# Patient Record
Sex: FEMALE | Race: WHITE | NOT HISPANIC OR LATINO | URBAN - METROPOLITAN AREA
[De-identification: names, ages, dates, MRNs, and addresses within clinical notes are randomized per-mention and may not be internally consistent; named-entity substitution may affect disease eponyms.]

---

## 2017-02-08 ENCOUNTER — TRANSFERRED RECORDS (OUTPATIENT)
Dept: HEALTH INFORMATION MANAGEMENT | Facility: CLINIC | Age: 52
End: 2017-02-08

## 2017-02-21 ENCOUNTER — TRANSFERRED RECORDS (OUTPATIENT)
Dept: HEALTH INFORMATION MANAGEMENT | Facility: CLINIC | Age: 52
End: 2017-02-21

## 2017-02-22 ENCOUNTER — TRANSFERRED RECORDS (OUTPATIENT)
Dept: HEALTH INFORMATION MANAGEMENT | Facility: CLINIC | Age: 52
End: 2017-02-22

## 2017-02-24 ENCOUNTER — TRANSFERRED RECORDS (OUTPATIENT)
Dept: HEALTH INFORMATION MANAGEMENT | Facility: CLINIC | Age: 52
End: 2017-02-24

## 2017-02-27 ENCOUNTER — TRANSFERRED RECORDS (OUTPATIENT)
Dept: HEALTH INFORMATION MANAGEMENT | Facility: CLINIC | Age: 52
End: 2017-02-27

## 2017-02-28 ENCOUNTER — TRANSFERRED RECORDS (OUTPATIENT)
Dept: HEALTH INFORMATION MANAGEMENT | Facility: CLINIC | Age: 52
End: 2017-02-28

## 2017-03-02 ENCOUNTER — TRANSFERRED RECORDS (OUTPATIENT)
Dept: HEALTH INFORMATION MANAGEMENT | Facility: CLINIC | Age: 52
End: 2017-03-02

## 2017-03-08 ENCOUNTER — ONCOLOGY VISIT (OUTPATIENT)
Dept: ONCOLOGY | Facility: CLINIC | Age: 52
End: 2017-03-08
Payer: COMMERCIAL

## 2017-03-08 ENCOUNTER — OFFICE VISIT (OUTPATIENT)
Dept: RADIATION ONCOLOGY | Facility: CLINIC | Age: 52
End: 2017-03-08
Payer: COMMERCIAL

## 2017-03-08 VITALS
HEART RATE: 86 BPM | WEIGHT: 226 LBS | TEMPERATURE: 98.2 F | SYSTOLIC BLOOD PRESSURE: 127 MMHG | OXYGEN SATURATION: 96 % | DIASTOLIC BLOOD PRESSURE: 86 MMHG | RESPIRATION RATE: 18 BRPM | HEIGHT: 65 IN | BODY MASS INDEX: 37.65 KG/M2

## 2017-03-08 VITALS
HEART RATE: 86 BPM | BODY MASS INDEX: 37.75 KG/M2 | OXYGEN SATURATION: 96 % | RESPIRATION RATE: 18 BRPM | TEMPERATURE: 98.2 F | HEIGHT: 65 IN | WEIGHT: 226.6 LBS | DIASTOLIC BLOOD PRESSURE: 86 MMHG | SYSTOLIC BLOOD PRESSURE: 127 MMHG

## 2017-03-08 DIAGNOSIS — C20 MALIGNANT NEOPLASM OF RECTUM (H): Primary | ICD-10-CM

## 2017-03-08 PROCEDURE — 99205 OFFICE O/P NEW HI 60 MIN: CPT | Performed by: INTERNAL MEDICINE

## 2017-03-08 PROCEDURE — 77263 THER RADIOLOGY TX PLNG CPLX: CPT | Performed by: RADIOLOGY

## 2017-03-08 PROCEDURE — 99205 OFFICE O/P NEW HI 60 MIN: CPT | Mod: 25 | Performed by: RADIOLOGY

## 2017-03-08 PROCEDURE — 77290 THER RAD SIMULAJ FIELD CPLX: CPT | Performed by: RADIOLOGY

## 2017-03-08 PROCEDURE — 77334 RADIATION TREATMENT AID(S): CPT | Performed by: RADIOLOGY

## 2017-03-08 RX ORDER — PROCHLORPERAZINE MALEATE 10 MG
10 TABLET ORAL EVERY 6 HOURS PRN
Qty: 30 TABLET | Refills: 2 | Status: SHIPPED | OUTPATIENT
Start: 2017-03-08

## 2017-03-08 RX ORDER — LORAZEPAM 0.5 MG/1
0.5 TABLET ORAL EVERY 4 HOURS PRN
Qty: 30 TABLET | Refills: 2 | Status: SHIPPED | OUTPATIENT
Start: 2017-03-08

## 2017-03-08 RX ORDER — CAPECITABINE 500 MG/1
TABLET, FILM COATED ORAL
Qty: 392 TABLET | Refills: 0 | Status: CANCELLED | OUTPATIENT
Start: 2017-03-20

## 2017-03-08 RX ORDER — CAPECITABINE 500 MG/1
TABLET, FILM COATED ORAL
Qty: 196 TABLET | Refills: 0 | Status: SHIPPED
Start: 2017-03-08 | End: 2017-04-13

## 2017-03-08 ASSESSMENT — ENCOUNTER SYMPTOMS
DIZZINESS: 0
SPEECH CHANGE: 0
NERVOUS/ANXIOUS: 1
NECK PAIN: 0
FOCAL WEAKNESS: 0
NAUSEA: 1
CONSTIPATION: 0
TINGLING: 0
WEAKNESS: 0
FEVER: 0
HALLUCINATIONS: 0
INSOMNIA: 0
MEMORY LOSS: 0
DIAPHORESIS: 0
LOSS OF CONSCIOUSNESS: 0
MYALGIAS: 0
VOMITING: 1
SORE THROAT: 0
SENSORY CHANGE: 0
EYES NEGATIVE: 1
BLOOD IN STOOL: 1
STRIDOR: 0
FALLS: 0
CARDIOVASCULAR NEGATIVE: 1
HEARTBURN: 0
CHILLS: 0
DIARRHEA: 1
SEIZURES: 0
DEPRESSION: 0
BACK PAIN: 1
HEADACHES: 1
ABDOMINAL PAIN: 0
TREMORS: 0
WEIGHT LOSS: 1
RESPIRATORY NEGATIVE: 1

## 2017-03-08 ASSESSMENT — PAIN SCALES - GENERAL
PAINLEVEL: MILD PAIN (3)
PAINLEVEL: MILD PAIN (3)

## 2017-03-08 ASSESSMENT — LIFESTYLE VARIABLES: SUBSTANCE_ABUSE: 0

## 2017-03-08 NOTE — PROGRESS NOTES
CHIEF COMPLAINT:  Newly diagnosed rectal cancer.      HISTORY OF PRESENT ILLNESS:  Matthew is a 51-year-old female, without any significant comorbidities, who started developing loose/soft diarrheal stools since the summer of 2016.  Prior to that, she was feeling normal.  She mentions to me that she usually gets about 5-6 bowel movements every day.  Over the last couple of months or so, she has noticed some discomfort in the lower back and lower abdomen, but she denies that it is a pain.  More recently, she has noticed a decrease in her appetite with some nausea and very occasional vomiting.  Over the last 2-1/2 months or so, she has lost 12 pounds.  She also mentions to me that she thinks her energy has progressively been getting lower; although, she still continues to work full-time, and is able to do the usual stuff, but at times she has to drag herself.  Prior to all of this, she was easily able to do 10,000 steps daily, but now she has noted it is much less than that, so she has stopped wearing the step band.  She denies any new swellings, neuropathy, infections, shortness of breath or any skin problems.  She denies any bleeding, including any rectal bleeding.  Because of these complaints, she was evaluated further, and she had an EGD and colonoscopy done on 02/21/2017.  The colonoscopy revealed a distal rectal mass about 5 cm long through which the colonoscope was able to be traversed.  It was friable, ulcerated and nonobstructing.  The biopsy from the mass revealed moderately differentiated adenocarcinoma.  DNA mismatch repair enzyme immunohistochemistry was intact.  She also had 2 tubular adenomas removed from descending colon and 2 from sigmoid colon.  Biopsy from the terminal ilium was normal.  EGD showed a normal esophagus, stomach and duodenum.  The random biopsies from the duodenum showed duodenal intraepithelial lymphocytosis, but normal villous architecture.  Stomach biopsy was normal without any  evidence of Helicobacter pylori.  After the diagnosis of the rectal cancer, she had a CT chest, abdomen and pelvis with contrast on 2017, which showed asymmetric wall thickening along the right lateral aspect of the rectal wall concerning for a rectal malignancy.  There were also small lymph nodes in the mesorectal fat, presacral space and along the SMA distribution, although they remained less than 1 cm in short axis dimension.  Multiple hepatic cysts were found, small bilateral renal cysts were found, and uterine fibroids near the fundus were found.     There was no convincing evidence of distant metastatic disease.  She had an MRI of the pelvis/rectum done on , which showed a rectal mass located 5 cm above the anal verge involving 4 cm of the length of the distal rectum.  The mass is located predominantly along the right side of the distal rectum involving 50% of the circumference of the rectum.  There is mild extension of the mass through the muscularis into the mesorectal fat.  Numerous enlarged mesorectal lymph nodes with the largest measuring 9 mm in short axis.  No other concerning findings were seen.  It was classified as a T3 N2 disease by MRI.  She met with Dr. Lopez at [facility], who recommended neoadjuvant chemoradiation followed by definitive surgery followed by adjuvant chemotherapy.  She met with Dr. Lovelace from Radiation Oncology earlier Today, who discussed starting her on neoadjuvant radiation; the plan is to start her on .  The patient herself is planning a trip to [place], [place], for spring break, and she will be back in 10 days.        REVIEW OF SYSTEMS:  Otherwise, a comprehensive review of systems was negative.      PAST MEDICAL HISTORY:  She had a hernia repair at 3 months,  in , left knee meniscus surgery in , appendix surgery in , and fibroadenoma removed from the right breast in 2016.      FAMILY HISTORY:  Mother had uterine cancer.   "The patient has 2 kids who are healthy.      SOCIAL HISTORY:  She does not smoke.  Drinks alcohol occasionally.  Works in the [employer] Department.  She lives with her  and 2 kids.      ALLERGIES:  None.      MEDICATIONS:  None.     /86 (BP Location: Left arm, Patient Position: Chair, Cuff Size: Adult Large)  Pulse 86  Temp 98.2  F (36.8  C) (Oral)  Resp 18  Ht 1.651 m (5' 5\")  Wt 102.5 kg (226 lb)  SpO2 96%  BMI 37.61 kg/m2  CONSTITUTIONAL: no acute distress  EYES: PERRLA, no palor or icterus.   ENT/MOUTH: no mouth lesions. Oropharynx normal  CVS: s1s2 no m r g .   RESPIRATORY: clear to auscultation b/l  GI: soft non tender no hepatosplenomegaly  NEURO: AAOX3  Grossly non focal neuro exam  INTEGUMENT: no obvious rashes  LYMPHATIC: no palpable cervical, supraclavicular, axillary or inguinal LAD  MUSCULOSKELETAL: Unremarkable. No bony tenderness.   EXTREMITIES: no edema  PSYCH: Mentation, mood and affect are normal. Decision making capacity is intact    LABORATORY DATA:  They are scanned into our system.  Labs were done on 02/24/2017.  CEA was 5.2.  Her CBC was unremarkable.  Her chemistry was also unremarkable.      IMAGING:  As described above.      PATHOLOGY:  As described above.      ASSESSMENT AND PLAN:     1.  She has stage IIIB or IIIC moderately differentiated adenocarcinoma of the rectum.  MSI is intact.  It is a clinical T3 N2 disease.  I am not sure if it is an N2b or N2c disease at this time.  We discussed the curative nature of her illness, and I discussed what stage III rectal cancer means.  We discussed the treatment options for it, including neoadjuvant chemotherapy with Xeloda along with radiation.  I did mention to her that once chemotherapy and radiation is finished, then we would do repeat imaging in about 6 weeks after completing the therapy with a repeat CT chest, abdomen and pelvis, as well as MRI of the pelvis.  This would be followed by surgery, and this would be followed most likely by " adjuvant chemotherapy.  We discussed neoadjuvant Xeloda along with radiation today.  I discussed the rationale, schedule, and potential side effects of it.  She was also given formal education about it, as well as handouts on the chemotherapy.  During the treatment, she would be seen regularly between myself and Mylene.  The plan currently is to start her on treatment on 03/20, and she would start Xeloda the same day.     2.  We also discussed the importance about maintaining good nutrition.  I discussed with her to start Ensure/Boost and high-protein shakes in order to maintain good nutrition and maintaining weight.  This would help her in tolerating the treatment better.   3.  We also discussed the importance of keeping herself active to help her with her fatigue, and I recommended regular routine exercise.   4.  We also discussed, considering her relatively young age at presentation of the rectal cancer, that it would be worthwhile discussing with a genetic counselor to see if she would benefit from genetic testing.  She is interested in that, and I have given her a referral for that.      All of her and her 's questions were answered to their satisfaction, and they were agreeable and comfortable with this plan.     Wisam Lion

## 2017-03-08 NOTE — NURSING NOTE
"Matthew Stover is a 51 year old female who presents for:  Chief Complaint   Patient presents with     Oncology Clinic Visit     NEW-Rectal Ca        Initial Vitals:  /86 (BP Location: Left arm, Patient Position: Chair, Cuff Size: Adult Large)  Pulse 86  Temp 98.2  F (36.8  C) (Oral)  Resp 18  Ht 1.651 m (5' 5\")  Wt 102.5 kg (226 lb)  SpO2 96%  BMI 37.61 kg/m2 Estimated body mass index is 37.61 kg/(m^2) as calculated from the following:    Height as of this encounter: 1.651 m (5' 5\").    Weight as of this encounter: 102.5 kg (226 lb).. Body surface area is 2.17 meters squared. BP completed using cuff size: large  Mild Pain (3) No LMP recorded. Patient has had an ablation. Allergies and medications reviewed.     Medications: Medication refills not needed today.  Pharmacy name entered into EPIC: Data Unavailable    Comments:     8 minutes for nursing intake (face to face time)   ESTEFANÍA TRUONG LPN        "

## 2017-03-08 NOTE — ORAL ONC MGMT
"Oral Chemotherapy Monitoring Program    Primary Oncologist: Dr. Wisam Lion  Primary Oncology Clinic: [facility]  Cancer Diagnosis: Rectal Cancer    Drug: capecitabine (Xeloda) 2000 mg in AM and 1500 mg in PM M-F during XRT  Start Date: ~3/20/17  Expected duration of therapy: For 28 days during XRT M-F. Total of 56 doses    Drug Interaction Assessment: No significant drug interactions identified upon review.    Lab Monitoring Plan  C1D1+   CMP, CBC C1D15+  C1D29+ call   C1D8+ Call C1D22+ Call, CMP, CBC C1D36+      Subjective/Objective:  Matthew Stover is a 51 year old female seen in clinic for an initial visit for oral chemotherapy education.   William was present for teaching.     ORAL CHEMOTHERAPY 3/8/2017   Drug Name Xeloda (Capecitabine)   Current Dosage Other   Current Schedule BID   Cycle Details M-F only during XRT   Planned next cycle start date 3/20/2017   Any new drug interactions? No   Is the dose as ordered appropriate for the patient? Yes   Is the patient currently in pain? Assessed in last 30 days.   Has the patient been assessed within the past 6 months for depression? Yes       Vitals:  BP:   BP Readings from Last 1 Encounters:   03/08/17 127/86     Wt Readings from Last 1 Encounters:   03/08/17 102.5 kg (226 lb)     Estimated body surface area is 2.17 meters squared as calculated from the following:    Height as of an earlier encounter on 3/8/17: 1.651 m (5' 5\").    Weight as of an earlier encounter on 3/8/17: 102.5 kg (226 lb).      Labs: Accessed from Care Everywhere through [facility]          Assessment:  Patient is appropriate to start therapy.    Plan:  Basic chemotherapy teaching was reviewed with the patient and the patient's family including indication, start date of therapy, dose, administration, adverse effects, missed doses, food and drug interactions, monitoring, side effect management, office contact information, and safe handling. Written materials were " provided and all questions answered.  Labs will be completed 3/20/17 prior to visit with Mitzi before starting treatment    Follow-Up:  Appt with Mitzi on 3/20. Not scheduled yet.      Ashley Ferrari. D.

## 2017-03-08 NOTE — MR AVS SNAPSHOT
After Visit Summary   3/8/2017    Matthew Stover    MRN: [id number]           Patient Information     Date Of Birth          1965        Visit Information        Provider Department      3/8/2017 10:15 AM Wisam Lion MD [facility]        Today's Diagnoses     Malignant neoplasm of rectum (H)    -  1      Care Instructions    Refer to Genetic counselor    Plan to start Radiation and Xeloda on 3/20.  She should see Mitzi on 3/20 with labs prior  Then weekly visits between Mitzi and myself             Follow-ups after your visit        Additional Services     CANCER RISK MGMT/CANCER GENETIC COUNSELING REFERRAL       Your provider has referred you to the Cancer Risk Management Program - Cancer Genetic Counseling.    Reason for Referral: genetic testing    We have a sent a notice to a staff member of the Cancer Risk Management Program to give you a call to assist with scheduling your appointment.  You may also call  [phone] ([phone]) to initiate scheduling.    Please be aware that coverage of these services is subject to the terms and limitations of your health insurance plan.  Call member services at your health plan with any benefit or coverage questions.      Please bring the completed family history sheet to your appointment in addition to any available outside medical records documenting your cancer diagnosis.                  Your next 10 appointments already scheduled     Mar 20, 2017  1:30 PM CDT   Verification Sim with Joce Lovelace MD, RADIATION THERAPIST   [facility])    [address] [place] [address] [phone]            Mar 20, 2017  2:30 PM CDT   LAB with LAB ONC [facility])    [address] [place] [address] [phone]           Patient must bring picture ID.  Patient should be prepared to give  a urine specimen  Please do not eat 10-12 hours before your appointment if you are coming in fasting for labs on lipids, cholesterol, or glucose (sugar).  Pregnant women should follow their Care Team instructions. Water with medications is okay. Do not drink coffee or other fluids.   If you have concerns about taking  your medications, please ask at office or if scheduling via [facility], send a message by clicking on Secure Messaging, Message Your Care Team.            Mar 20, 2017  3:15 PM CDT   [facility]    [address] [facility] [address]   [phone]            Mar 21, 2017  3:15 PM CDT   TREATMENT with RADIATION THERAPIST   [facility]    [address] [facility] [address]   [phone]            Mar 22, 2017  3:15 PM CDT   TREATMENT with RADIATION THERAPIST   [facility]    [address] [facility] [address]   [phone]            Mar 23, 2017  3:15 PM CDT   T[facility]AT[facility] with RAD[facility] THERAPIST   [facility]    [address] [facility] [address]   [phone]            Mar 23, 2017  3:30 PM CDT   on treatment visit with Joce Lovelace MD   [facility])    [address] [facility] [address]   [phone]            Mar 24, 2017  3:15 PM CDT   TREATMENT with RADIATION THERAPIST   [facility]    [address] [facility] [address]   [phone]            Mar 27, 2017  3:15 PM CDT   TREATMENT with RAD[facility] THERAPIST   [facility]    [address] [facility] [address]   [phone]              Who to contact     If you have  "questions or need follow up information about today's clinic visit or your schedule please contact [facility] directly at [phone].  Normal or non-critical lab and imaging results will be communicated to you by [employer], letter or phone within 4 business days after the clinic has received the results. If you do not hear from us within 7 days, please contact the clinic through [employer] or phone. If you have a critical or abnormal lab result, we will notify you by phone as soon as possible.  Submit refill requests through [employer] or call your pharmacy and they will forward the refill request to us. Please allow 3 business days for your refill to be completed.          Additional Information About Your Visit        Shout TV Information     Sh[employer] TV is an electronic gateway that provides easy, online access to your medical records. With Shout TV, you can request a clinic appointment, read your test results, renew a prescription or communicate with your care team.     To sign up for [employer] visit the website at [email or web address]   You will be asked to enter the access code listed below, as well as some personal information. Please follow the directions to create your username and password.     Your access code is: [email or web address]  Expires: 2017 11:06 AM     Your access code will  in 90 days. If you need help or a new code, please contact your [facility] or call [phone] for assistance.        Care EveryWhere ID     This is your Care EveryWhere ID. This could be used by other organizations to access your [facility] medical records  [id number]        Your Vitals Were     Pulse Temperature Respirations Height Pulse Oximetry BMI (Body Mass Index)    86 98.2  F (36.8  C) (Oral) 18 1.651 m (5' 5\") 96% 37.61 kg/m2       Blood Pressure from Last 3 Encounters:   17 127/86   17 127/86    Weight from Last 3 Encounters:   17 102.5 kg (226 lb) "   03/08/17 102.8 kg (226 lb 9.6 oz)              We Performed the Following     CANCER RISK MGMT/CANCER GENETIC COUNSELING REFERRAL          Today's Medication Changes          These changes are accurate as of: 3/8/17 11:59 PM.  If you have any questions, ask your nurse or doctor.               Start taking these medicines.        Dose/Directions    capecitabine 500 MG tablet CHEMO   Commonly known as:  XELODA   Used for:  Malignant neoplasm of rectum (H)   Started by:  Mari Stokes RPH        Take 4 tablets (2000mg) in the AM and 3 tablets (1500mg) in the PM. Take for 5 days per week Monday-Friday. Do NOT take on Saturday-Sunday. Take with water within 30 minutes after meal.   Quantity:  196 tablet   Refills:  0       LORazepam 0.5 MG tablet   Commonly known as:  ATIVAN   Used for:  Malignant neoplasm of rectum (H)   Started by:  Mari Stokes RPH        Dose:  0.5 mg   Take 1 tablet (0.5 mg) by mouth every 4 hours as needed (Anxiety, Nausea/Vomiting or Sleep)   Quantity:  30 tablet   Refills:  2       prochlorperazine 10 MG tablet   Commonly known as:  COMPAZINE   Used for:  Malignant neoplasm of rectum (H)   Started by:  Mari Stokes RPH        Dose:  10 mg   Take 1 tablet (10 mg) by mouth every 6 hours as needed (Nausea/Vomiting)   Quantity:  30 tablet   Refills:  2            Where to get your medicines      These medications were sent to [facility] [place], [place] - [address]  [address], [facility] [address]     Phone:  [phone]     capecitabine 500 MG tablet CHEMO    prochlorperazine 10 MG tablet         Some of these will need a paper prescription and others can be bought over the counter.  Ask your nurse if you have questions.     Bring a paper prescription for each of these medications     LORazepam 0.5 MG tablet                Primary Care Provider Office Phone # Fax #    Mal Ladd [phone]       [address]  [address]  [place] [address]        Thank you!     Thank you for choosing [facility]  for your care. Our goal is always to provide you with excellent care. Hearing back from our patients is one way we can continue to improve our services. Please take a few minutes to complete the written survey that you may receive in the mail after your visit with us. Thank you!             Your Updated Medication List - Protect others around you: Learn how to safely use, store and throw away your medicines at www.disposemymeds.org.          This list is accurate as of: 3/8/17 11:59 PM.  Always use your most recent med list.                   Brand Name Dispense Instructions for use    capecitabine 500 MG tablet CHEMO    XELODA    196 tablet    Take 4 tablets (2000mg) in the AM and 3 tablets (1500mg) in the PM. Take for 5 days per week Monday-Friday. Do NOT take on Saturday-Sunday. Take with water within 30 minutes after meal.       LORazepam 0.5 MG tablet    ATIVAN    30 tablet    Take 1 tablet (0.5 mg) by mouth every 4 hours as needed (Anxiety, Nausea/Vomiting or Sleep)       prochlorperazine 10 MG tablet    COMPAZINE    30 tablet    Take 1 tablet (10 mg) by mouth every 6 hours as needed (Nausea/Vomiting)

## 2017-03-08 NOTE — PATIENT INSTRUCTIONS
What to expect at your Simulation visit:    You will meet with a Radiation Therapist and other team members who will be doing a planning session called a  simulation  with you. This process will determine your daily treatment.    ~ You will lie on a flat table and have a treatment planning CT scan.  It is important during the scan to hold very still and breathe normally.    ~ Your therapist may construct a body mold to help you hold still for your treatments.    ~ If you are having treatment to the head or neck area you will be fitted with a plastic mesh mask that fits very snugly over your face and neck.     ~ Your therapist will be taking some digital photos that will go in your treatment chart.      ~Your therapist will make marks on your skin and take measurements. Your therapist may ask you about making small tattoos (a permanent small dot) over these marks.  These marks are used to position you daily for your radiation therapy treatments. Please do not wash off any marks until all of your radiation therapy treatments are complete unless you are instructed to do so by your therapist.    ~ Once the simulation is completed it can take from 3 to 10 business days before you start radiation therapy treatments.    ~ You may meet with a nurse who will go over management of treatment side effects and self care during your treatments. The nurse will help to plan care with other departments and physicians if needed.    Additional information for simulation of the pelvis area   ~ It is important to have the same amount of urine in your bladder for each treatment.  In order to prepare for your simulation and for daily treatments, please:  Empty your bladder two hours prior to your scheduled appointment time.  Then drink about 2 to 4 cups of liquid to fill your bladder again.  It is important to drink the same amount of liquid each day.  Don t drink too much- you should be comfortable during your simulation and  treatment.  You should not empty your bladder again until the simulation or treatment appointment is complete.  ~You may have a small tube placed in your vagina for a few minutes during the CT scan to help your doctor see your internal organs.   ~Please speak with your physician, nurse or therapist if you have any questions regarding the above information.  If you feel you may have difficulties with this preparation, please let us know.  Please contact [facility] RN with questions or concerns following today's appointment: [phone].    Thank you!

## 2017-03-08 NOTE — MR AVS SNAPSHOT
After Visit Summary   3/8/2017    Matthew Stover    MRN: [id number]           Patient Information     Date Of Birth          1965        Visit Information        Provider Department      3/8/2017 8:00 AM Joce Lovelace MD [facility]        Today's Diagnoses     Malignant neoplasm of rectum (H)    -  1      Care Instructions          What to expect at your Simulation visit:    You will meet with a Radiation Therapist and other team members who will be doing a planning session called a  simulation  with you. This process will determine your daily treatment.    ~ You will lie on a flat table and have a treatment planning CT scan.  It is important during the scan to hold very still and breathe normally.    ~ Your therapist may construct a body mold to help you hold still for your treatments.    ~ If you are having treatment to the head or neck area you will be fitted with a plastic mesh mask that fits very snugly over your face and neck.     ~ Your therapist will be taking some digital photos that will go in your treatment chart.      ~Your therapist will make marks on your skin and take measurements. Your therapist may ask you about making small tattoos (a permanent small dot) over these marks.  These marks are used to position you daily for your radiation therapy treatments. Please do not wash off any marks until all of your radiation therapy treatments are complete unless you are instructed to do so by your therapist.    ~ Once the simulation is completed it can take from 3 to 10 business days before you start radiation therapy treatments.    ~ You may meet with a nurse who will go over management of treatment side effects and self care during your treatments. The nurse will help to plan care with other departments and physicians if needed.    Additional information for simulation of the pelvis area   ~ It is important to have the same amount of urine in your bladder  for each treatment.  In order to prepare for your simulation and for daily treatments, please:  Empty your bladder two hours prior to your scheduled appointment time.  Then drink about 2 to 4 cups of liquid to fill your bladder again.  It is important to drink the same amount of liquid each day.  Don t drink too much- you should be comfortable during your simulation and treatment.  You should not empty your bladder again until the simulation or treatment appointment is complete.  ~You may have a small tube placed in your vagina for a few minutes during the CT scan to help your doctor see your internal organs.   ~Please speak with your physician, nurse or therapist if you have any questions regarding the above information.  If you feel you may have difficulties with this preparation, please let us know.  Please contact [facility] RN with questions or concerns following today's appointment: [phone].    Thank you!          Follow-ups after your visit        Your next 10 appointments already scheduled     Apr 05, 2017  3:15 PM CDT   TREATMENT with RADIATION THERAPIST   [facility]    [address] [place] [facility] [address]   [phone]            Apr 06, 2017  3:15 PM CDT   TREATMENT with RADIATION THERAPIST   [facility]    [address] [place] [address]   [phone]            Apr 06, 2017  3:30 PM CDT   on treatment visit with Joce Lovelace MD   [facility]    [address] [place] [facility] [address]   [phone]            Apr 07, 2017  8:15 AM CDT   TREATMENT with RADIATION THERAPIST   [facility] ([facility])    [address] [place] [facility] [address]   [phone]            Apr 10, 2017  3:15 PM CDT   TREATMENT with RADIATION THERAPIST   [facility] ([facility])    [address] [facility] [address] [phone]            Apr 11, 2017  3:15 PM CDT   TREATMENT with RADIATION THERAPIST   [facility]    [address] [facility] [address] [phone]            Apr 12, 2017  3:15 PM CDT   TREATMENT with RADIATION THERAPIST   [facility]    [address] [facility] [id number] [phone]            Apr 12, 2017  3:30 PM CDT   LAB with LAB ONC [facility] ([facility])    [address] [facility] [id number] [phone]           Patient must bring picture ID.  Patient should be prepared to give a urine specimen  Please do not eat 10-12 hours before your appointment if you are coming in fasting for labs on lipids, cholesterol, or glucose (sugar).  Pregnant women should follow their Care Team instructions. Water with medications is okay. Do not drink coffee or other fluids.   If you have concerns about taking  your medications, please ask at office or if scheduling via [employer], send a message by clicking on Secure Messaging, Message Your Care Team.            Apr 12, 2017  4:15 PM CDT   Return Visit with Wisam Lion MD   [facility]    [address] [facility] [id number] [phone]            Apr 13, 2017  3:15 PM CDT   TREATMENT with RADIATION THERAPIST   [facility]    [address] [facility] [address] [phone]              Who to contact     If you have questions or need follow up information about today's clinic visit or your schedule please contact [facility] directly at [phone].  Normal or non-critical lab and imaging results will be communicated to you by MyChart, letter or phone within 4  "business days after the clinic has received the results. If you do not hear from us within 7 days, please contact the clinic through [employer] or phone. If you have a critical or abnormal lab result, we will notify you by phone as soon as possible.  Submit refill requests through [employer] or call your pharmacy and they will forward the refill request to us. Please allow 3 business days for your refill to be completed.          Additional Information About Your Visit        [employer] Information     [employer] gives you secure access to your electronic health record. If you see a primary care provider, you can also send messages to your care team and make appointments. If you have questions, please call your primary care clinic.  If you do not have a primary care provider, please call [phone] and they will assist you.      [employer] is an electronic gateway that provides easy, online access to your medical records. With [employer], you can request a clinic appointment, read your test results, renew a prescription or communicate with your care team.     To access your existing account, please contact your [facility] or call [phone] for assistance.        Care EveryWhere ID     This is your Care EveryWhere ID. This could be used by other organizations to access your [place] medical records  [id number]        Your Vitals Were     Pulse Temperature Respirations Height Pulse Oximetry BMI (Body Mass Index)    86 98.2  F (36.8  C) (Oral) 18 5' 5\" 96% 37.71 kg/m2       Blood Pressure from Last 3 Encounters:   03/27/17 (!) 142/96   03/17/17 (!) 140/96   03/08/17 127/86    Weight from Last 3 Encounters:   03/29/17 229 lb   03/27/17 228 lb   03/23/17 224 lb              Today, you had the following     No orders found for display         Today's Medication Changes          These changes are accurate as of: 3/8/17 11:59 PM.  If you have any questions, ask your nurse or doctor.               Start " taking these medicines.        Dose/Directions    capecitabine 500 MG tablet CHEMO   Commonly known as:  XELODA   Used for:  Malignant neoplasm of rectum (H)   Started by:  Mari Stokes RPH        Take 4 tablets (2000mg) in the AM and 3 tablets (1500mg) in the PM. Take for 5 days per week Monday-Friday. Do NOT take on Saturday-Sunday. Take with water within 30 minutes after meal.   Quantity:  196 tablet   Refills:  0       LORazepam 0.5 MG tablet   Commonly known as:  ATIVAN   Used for:  Malignant neoplasm of rectum (H)   Started by:  Mari Stokes RPH        Dose:  0.5 mg   Take 1 tablet (0.5 mg) by mouth every 4 hours as needed (Anxiety, Nausea/Vomiting or Sleep)   Quantity:  30 tablet   Refills:  2       prochlorperazine 10 MG tablet   Commonly known as:  COMPAZINE   Used for:  Malignant neoplasm of rectum (H)   Started by:  Mari Stokes RPH        Dose:  10 mg   Take 1 tablet (10 mg) by mouth every 6 hours as needed (Nausea/Vomiting)   Quantity:  30 tablet   Refills:  2            Where to get your medicines      These medications were sent to [facility] [place] - [address]  [address], [facility] [address]     Phone:  [phone]     capecitabine 500 MG tablet CHEMO    prochlorperazine 10 MG tablet         Some of these will need a paper prescription and others can be bought over the counter.  Ask your nurse if you have questions.     Bring a paper prescription for each of these medications     LORazepam 0.5 MG tablet                Primary Care Provider Office Phone # Fax #    Mal Ladd [phone]       [address]  [place] [facility] [address]        Thank you!     Thank you for choosing [facility]  for your care. Our goal is always to provide you with excellent care. Hearing back from our patients is one way we can continue to improve our services. Please take a few minutes to complete the written  survey that you may receive in the mail after your visit with us. Thank you!             Your Updated Medication List - Protect others around you: Learn how to safely use, store and throw away your medicines at www.disposemymeds.org.          This list is accurate as of: 3/8/17 11:59 PM.  Always use your most recent med list.                   Brand Name Dispense Instructions for use    capecitabine 500 MG tablet CHEMO    XELODA    196 tablet    Take 4 tablets (2000mg) in the AM and 3 tablets (1500mg) in the PM. Take for 5 days per week Monday-Friday. Do NOT take on Saturday-Sunday. Take with water within 30 minutes after meal.       LORazepam 0.5 MG tablet    ATIVAN    30 tablet    Take 1 tablet (0.5 mg) by mouth every 4 hours as needed (Anxiety, Nausea/Vomiting or Sleep)       prochlorperazine 10 MG tablet    COMPAZINE    30 tablet    Take 1 tablet (10 mg) by mouth every 6 hours as needed (Nausea/Vomiting)

## 2017-03-08 NOTE — PROGRESS NOTES
"  HPI      Review of Systems   Constitutional: Positive for weight loss. Negative for chills, diaphoresis, fever and malaise/fatigue.        Patient reports approximate 12 pound weight loss over the past one month related to decreased appetite.   HENT: Negative for congestion, ear discharge, ear pain, hearing loss, nosebleeds, sore throat and tinnitus.         Intermittent headaches, reports taking Ibuprofen as needed.   Eyes: Negative.    Respiratory: Negative.  Negative for stridor.    Cardiovascular: Negative.    Gastrointestinal: Positive for blood in stool, diarrhea, nausea and vomiting. Negative for abdominal pain, constipation, heartburn and melena.        Patient reports on-going nausea, intermittent vomiting weekly, patient reports \"I just do not feel well\".  Reports four to six liquid bowel movements per day, \"I can't void my bowels completely and every third day I have been taking [employer]! Cleanse\".  Patient also reports, \"I guess I have blood in my stool, but we have a black toilet and it's hard to tell so until I gave a sample I didn't notice it\".     Genitourinary: Negative.    Musculoskeletal: Positive for back pain. Negative for falls, joint pain, myalgias and neck pain.        See pain note.   Skin: Negative.    Neurological: Positive for headaches. Negative for dizziness, tingling, tremors, sensory change, speech change, focal weakness, seizures, loss of consciousness and weakness.   Endo/Heme/Allergies: Negative.    Psychiatric/Behavioral: Negative for depression, hallucinations, memory loss, substance abuse and suicidal ideas. The patient is nervous/anxious. The patient does not have insomnia.         Nervousness/anxiety related to recent diagnosis and treatment plan.             INITIAL PATIENT ASSESSMENT    Diagnosis: Rectal cancer    Prior radiation therapy: None    Prior chemotherapy: None    Prior hormonal therapy: Patient reports history of oral birth control use for approximately 15 " years.    Pain Eval:  Current history of pain associated with this visit:   Intensity: 3/10  Current: discomfort  Location: bilateral low back  Treatment: patient denies need for medication management.    Psychosocial  Living arrangements: Lives at home in [place]  and family  Fall Risk: independent   referral needs: Not needed    Advanced Directive: No  Implantable Cardiac Device? No    Onset of menarche: age 13  LMP: No LMP recorded. Patient has had an ablation.  Onset of menopause: Patient reports endometrial ablation 7/1/2015.  Abnormal vaginal bleeding/discharge: No  Are you pregnant? No  Reproductive note: Patient reports history of two pregnancies, two living children, first pregnancy at age 33.    PCP: Dr. Mal Ladd [facility]  Surgical Oncology: Dr. Sonya Lopez  Medical Oncology: Dr. Lion (patient scheduled for consult on 3/8/2017)

## 2017-03-08 NOTE — PATIENT INSTRUCTIONS
Refer to Genetic counselor    Plan to start Radiation and Xeloda on 3/20.  She should see Mitzi on 3/20 with labs prior  Then weekly visits between Mitzi and myself

## 2017-03-09 ENCOUNTER — CARE COORDINATION (OUTPATIENT)
Dept: ONCOLOGY | Facility: CLINIC | Age: 52
End: 2017-03-09

## 2017-03-09 PROCEDURE — 00000346 ZZHCL STATISTIC REVIEW OUTSIDE SLIDES TC 88321: Performed by: INTERNAL MEDICINE

## 2017-03-09 NOTE — PROGRESS NOTES
Returned patient's phone call as she was inquiring about setting up appointment with RHETT Cartagena on 3/20 which will be first chemoradiation day.  She had questions about picking up oral chemo - Xeloda.  Advised that chemo needs to go through a prior authorization which takes a few days and then she can find out what pharmacy she can pick it up at.  It would be fine for her to take the Xeloda right before her first radiation if she is able to fill it at [facility] - writer spoke with Debo in Pharmacy regarding this.  Writer informed patient of appointment time with Mitzi and that she should be getting a call from our schedulers to set up future appointments.  Advised to call with further questions.  Patient is currently vacationing in [place].

## 2017-03-09 NOTE — PROGRESS NOTES
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  The patient has a clinical stage III (T3N2M0).  She was referred today for discussion of neoadjuvant radiotherapy.      Ms. Matthew Stover was seen in consultation at [facility], Radiation Oncology, on 03/08/2017.  The consultation was at the request of Dr. Sonya Lopez.  The patient's available chart, pathology and pertinent radiology films were reviewed.      HISTORY OF PRESENT ILLNESS:  Ms. Stover is a 51-year-old female with a fairly unremarkable past medical history who presents with a recently diagnosed rectal cancer.  She was referred today for discussion of neoadjuvant radiotherapy.      Ms. Stover initially presented after having approximately a 6-month history of diarrhea.  Specifically, she had 5-6 loose stools per day that would actually wake her up.  She denies any problems with bleeding or other issues.  She did also note a significant gain of weight, approximately 35 pounds or so, that apparently was due to decreased exercise due to her ongoing diarrhea.      Given her symptoms, she was seen by Gastroenterology on 02/08/2017.  Examination was not done at that visit, apparently.  However, they did recommend laboratory workup with possible EGD and colonoscopy.  The patient did undergo a fairly extensive laboratory workup and infectious workup which, for the most part, were unremarkable.  Given this, they did recommend endoscopy.      The patient subsequently underwent an upper GI endoscopy on 02/21/2017 which was fairly unremarkable.  Biopsies were taken.  The patient also had a colonoscopy that same day, and had a good prep.  There were several polyps removed, including one in the sigmoid, the cecum, and the descending colon.  There was also a tumor located in the distal rectum, located at 5 cm.  It was friable, ulcerated and nonobstructing.  This was biopsied.  She did have some external hemorrhoids with  inflammation.  Pathology ([place] showed from the duodenal biopsies an increase in intraepithelial lymphocytes with normal villous architecture.  Stomach biopsies were unremarkable with no evidence for H. pylori.  The cecal polyp showed a tubular adenoma that was 2 mm, which was completely resected, with no evidence of high-grade dysplasia.  The terminal ileal biopsy showed normal small bowel mucosa with no ileitis.  A descending polyp showed a tubular adenoma, again with no evidence of high-grade dysplasia.  The sigmoid colon biopsy also showed tubular adenomas that showed no evidence of high-grade dysplasia.  The distal rectal mass, however, did show a moderately differentiated adenocarcinoma.  The DNA mismatch repair enzymes were intact.      Given these findings, the patient underwent further staging with a CT scan of the chest, abdomen and pelvis, 02/22/2017, at [facility].  This showed asymmetric wall thickening along the right lateral aspect of the rectal wall, concerning for the site of the patient's malignancy.  Much of the sigmoid colon was contracted, and there was a moderate amount of stool in the cecum and ascending colon.  There were small lymph nodes in the mesorectal fat, presacral space, and along the SMA distribution; however, these lymph nodes were less than 1 cm in short axis; however, did recommend attention on follow-up.  Otherwise, the findings were unremarkable.  There were multiple hepatic cysts with no evidence of enhancing liver lesion.  Small bilateral renal cysts and suspected uterine fibroids.      The patient also underwent an MRI of the pelvis on 02/24/2017, which showed the rectal mass located 5 cm above the anal verge.  This involved a 4 cm length of the distal rectum.  The mass was predominantly located along the right side of the distal rectum and involved 50% of the circumference of the rectum.  There was mild extension of the mass into the muscularis and into the  mesorectal fat.  There were numerous enlarged mesorectal lymph nodes, with the largest measuring 9 mm.  Thus, the patient was felt to have a stage T3N2.  The patient did have some workup including a CEA on 02/24 which was 5.2.  Her hemoglobin was normal at 14.8.  Otherwise, labs were fairly unremarkable.      At that point, the patient was seen by Dr. Lopez on 09/27/2017.  Digital rectal exam by Dr. Lopze revealed a lesion approximately 6 cm from the anal verge which was near circumferential.  Dr. Lopez did review the imaging, and recommended consideration for neoadjuvant chemoradiation and also a follow-up colonoscopy for tattooing.  This was scheduled for 03/02.  However, we do not have the records from that visit.  The patient was referred to us for consideration of neoadjuvant radiotherapy.  The patient is also going to see Dr. Lion later today for discussion of chemotherapy.      Currently, Ms. Stover is doing overall well.  She has now lost approximately 10 pounds over the last month or so due to decreased appetite.  She continues to have 4-6 loose bowel movements per day.  It is unclear whether or not she has had any bleeding.  She does have quite a bit of anxiety with her diagnosis.  She also has some intermittent nausea.  She has no other complaints.      PAST MEDICAL HISTORY:   1.  Rectal cancer.   2.  Colon polyps.   3.  Status post upper endoscopy.   4.  Status post NovaSure endometrial ablation in 2015.   5.  Status post right breast biopsy.   6.  Status post hysteroscopy with removal of an endometrial polyp.   7.  History of a hernia repair as a child.   8.  History of a meniscal repair.   9.  Status post colonoscopy with rectal biopsy, per HPI.   10.  Status post caesarean section.   11.  Status post appendectomy.      PAST GYNECOLOGIC HISTORY:  The patient has no longer been having menstrual periods since her endometrial ablation; however, she does feel that she may still continue to cycle,  and thus is probably still premenopausal.  She has not had any symptoms of menopause.  She is sexually active; however, her  has had a vasectomy, and thus denies any possibility of being pregnant.  She was on oral contraceptives for approximately 15 years.      MEDICATIONS:  None.      ALLERGIES:  No known drug allergies.      SOCIAL HISTORY:  The patient is , presents with her  today.  She denies any tobacco use.  Drinks occasional alcohol.      FAMILY HISTORY:  Assessed and notable for uterine cancer in her mother, and otherwise is unremarkable.  The patient was referred to genetics; however, apparently she has not seen them yet.      REVIEW OF SYSTEMS:  A full 14-point review of systems was performed.  Pertinent positives and negatives are noted in the HPI, otherwise documented in the patient's electronic medical record system.  Remaining review of systems is noncontributory.      PHYSICAL EXAM:   VITALS:  Blood pressure is 127/86, pulse 86, respirations 18.  Weight is 226 pounds.   GENERAL:  She is alert and oriented, in no distress.   HEENT:  Head is normocephalic, atraumatic.  Extraocular movements intact.  Pupils are equal and reactive to light and accommodation.  Oral cavity and oropharynx are clear with moist mucosal membranes.   NECK:  Supple.  No cervical or supraclavicular adenopathy.   CARDIAC:  Regular rate and rhythm.  No murmurs.   LUNGS:  Clear to auscultation bilaterally.   ABDOMEN:  Soft, nondistended, nontender.   EXTREMITIES:  Warm with no edema.   RECTUM:  Digital rectal exam revealed a rectal mass, approximately 5-6 cm from the anal verge.  This seems to be most prominent on the right side.   NEUROLOGIC:  Cranial nerves II through XII are grossly intact.  Strength and sensation to light touch are intact.      PATHOLOGY:  Per HPI.      IMAGING:  Per HPI.  We also did review CT scans from 2008 and 2012 when she did have liver cysts at that point, further supporting that  these are likely benign.  Some of them were not there in 2008.  However, they were there in 2012 and have not significantly changed.      ASSESSMENT:  Ms. Stover is a 51-year-old female with a newly diagnosed moderately differentiated adenocarcinoma of the distal rectum.  She likely has a clinical stage III (T3N2M0).  She was referred today for discussion of neoadjuvant radiotherapy.  The patient is otherwise healthy with no significant limiting comorbidities.      RECOMMENDATIONS:  I had a discussion with Ms. Stover and her  regarding the role of radiation in the treatment of her rectal cancer.  Specifically, I agree with the recommendation to proceed with neoadjuvant chemoradiation.  We discussed that the intent is to improve local regional control.  She has also discussed this with Dr. Lopez who has discussed follow-up after completion of her neoadjuvant therapy, including a restaging along with surgery likely two months or so after neoadjuvant therapy.  Dr. Lopez did discuss surgical options including LAR or possible APR.      I discussed radiotherapy in more detail.  I discussed the potential acute side effects can include but are not limited to fatigue, skin irritation, hair loss in the treatment field, GI and  symptoms, and cytopenias.  I also discussed potential long-term complications including fibrosis of the tissues, including the vagina and the need to use a dilator, damage to the bowel with risk for obstruction, damage to the hips and bones with increased risk for fractures, damage to the bladder, effect on ovarian function and likely inducing menopause if she is not already post-menopausal, and long-term effect on bowel and bladder function.  We also discussed the potential small risk for secondary malignancies.      After a thorough discussion regarding the risks, benefits, and alternatives to radiotherapy, Ms. Stover wishes to proceed with treatment.  She signed an informed consent at  today's visit, and underwent a CT simulation following our consultation.  We did discuss initiation of her treatment and discussed potentially starting next week; however, the patient does have a 10-day spring break plan with her son who is a senior, and she would like to go on that.  Thus, we will plan on starting the week of .  Again, the patient is going to see Dr. Lion after our visit today to discuss concurrent chemotherapy.      Thank you for allowing us to participate in this patient's care.  Please feel free to call with any questions or concerns.         MAKI HERNANDEZ MD             D: 2017 09:46   T: 2017 01:36   MT: [id number]      Name:     DAVID QUINTANA   MRN:      [id number]        Account:      [id number]   :      1965           Visit Date:   2017      Document: [id number]       cc: Wisam Lion MD       [facility]        Mal Ladd MD

## 2017-03-13 ENCOUNTER — APPOINTMENT (OUTPATIENT)
Dept: RADIATION ONCOLOGY | Facility: CLINIC | Age: 52
End: 2017-03-13
Payer: COMMERCIAL

## 2017-03-13 PROCEDURE — 77300 RADIATION THERAPY DOSE PLAN: CPT | Performed by: RADIOLOGY

## 2017-03-13 PROCEDURE — 77334 RADIATION TREATMENT AID(S): CPT | Performed by: RADIOLOGY

## 2017-03-13 PROCEDURE — 77295 3-D RADIOTHERAPY PLAN: CPT | Performed by: RADIOLOGY

## 2017-03-16 ENCOUNTER — DOCUMENTATION ONLY (OUTPATIENT)
Dept: LAB | Facility: CLINIC | Age: 52
End: 2017-03-16

## 2017-03-17 ENCOUNTER — ONCOLOGY VISIT (OUTPATIENT)
Dept: ONCOLOGY | Facility: CLINIC | Age: 52
End: 2017-03-17
Payer: COMMERCIAL

## 2017-03-17 VITALS
HEIGHT: 65 IN | HEART RATE: 89 BPM | DIASTOLIC BLOOD PRESSURE: 96 MMHG | RESPIRATION RATE: 15 BRPM | BODY MASS INDEX: 37.32 KG/M2 | SYSTOLIC BLOOD PRESSURE: 140 MMHG | TEMPERATURE: 98.5 F | WEIGHT: 224 LBS | OXYGEN SATURATION: 96 %

## 2017-03-17 DIAGNOSIS — C20 MALIGNANT NEOPLASM OF RECTUM (H): Primary | ICD-10-CM

## 2017-03-17 DIAGNOSIS — R79.89 ELEVATED SERUM CREATININE: ICD-10-CM

## 2017-03-17 DIAGNOSIS — C20 RECTAL CANCER (H): Primary | ICD-10-CM

## 2017-03-17 DIAGNOSIS — R63.4 LOSS OF WEIGHT: ICD-10-CM

## 2017-03-17 DIAGNOSIS — C20 MALIGNANT NEOPLASM OF RECTUM (H): ICD-10-CM

## 2017-03-17 LAB
ALBUMIN SERPL-MCNC: 3.6 G/DL (ref 3.4–5)
ALP SERPL-CCNC: 75 U/L (ref 40–150)
ALT SERPL W P-5'-P-CCNC: 21 U/L (ref 0–50)
ANION GAP SERPL CALCULATED.3IONS-SCNC: 4 MMOL/L (ref 3–14)
AST SERPL W P-5'-P-CCNC: 14 U/L (ref 0–45)
BASOPHILS # BLD AUTO: 0 10E9/L (ref 0–0.2)
BASOPHILS NFR BLD AUTO: 0.5 %
BILIRUB SERPL-MCNC: 0.5 MG/DL (ref 0.2–1.3)
BUN SERPL-MCNC: 9 MG/DL (ref 7–30)
CALCIUM SERPL-MCNC: 9.3 MG/DL (ref 8.5–10.1)
CHLORIDE SERPL-SCNC: 104 MMOL/L (ref 94–109)
CO2 SERPL-SCNC: 33 MMOL/L (ref 20–32)
COPATH REPORT: NORMAL
CREAT SERPL-MCNC: 1.2 MG/DL (ref 0.52–1.04)
DIFFERENTIAL METHOD BLD: NORMAL
EOSINOPHIL # BLD AUTO: 0.2 10E9/L (ref 0–0.7)
EOSINOPHIL NFR BLD AUTO: 3.1 %
ERYTHROCYTE [DISTWIDTH] IN BLOOD BY AUTOMATED COUNT: 13.2 % (ref 10–15)
GFR SERPL CREATININE-BSD FRML MDRD: 48 ML/MIN/1.7M2
GLUCOSE SERPL-MCNC: 108 MG/DL (ref 70–99)
HCT VFR BLD AUTO: 43 % (ref 35–47)
HGB BLD-MCNC: 14.5 G/DL (ref 11.7–15.7)
LYMPHOCYTES # BLD AUTO: 1.9 10E9/L (ref 0.8–5.3)
LYMPHOCYTES NFR BLD AUTO: 25.6 %
MCH RBC QN AUTO: 28.9 PG (ref 26.5–33)
MCHC RBC AUTO-ENTMCNC: 33.7 G/DL (ref 31.5–36.5)
MCV RBC AUTO: 86 FL (ref 78–100)
MONOCYTES # BLD AUTO: 0.5 10E9/L (ref 0–1.3)
MONOCYTES NFR BLD AUTO: 7.3 %
NEUTROPHILS # BLD AUTO: 4.7 10E9/L (ref 1.6–8.3)
NEUTROPHILS NFR BLD AUTO: 63.5 %
PLATELET # BLD AUTO: 277 10E9/L (ref 150–450)
POTASSIUM SERPL-SCNC: 3.5 MMOL/L (ref 3.4–5.3)
PROT SERPL-MCNC: 7.6 G/DL (ref 6.8–8.8)
RBC # BLD AUTO: 5.01 10E12/L (ref 3.8–5.2)
SODIUM SERPL-SCNC: 141 MMOL/L (ref 133–144)
WBC # BLD AUTO: 7.4 10E9/L (ref 4–11)

## 2017-03-17 PROCEDURE — 85025 COMPLETE CBC W/AUTO DIFF WBC: CPT | Performed by: NURSE PRACTITIONER

## 2017-03-17 PROCEDURE — 36415 COLL VENOUS BLD VENIPUNCTURE: CPT | Performed by: NURSE PRACTITIONER

## 2017-03-17 PROCEDURE — 99214 OFFICE O/P EST MOD 30 MIN: CPT | Performed by: NURSE PRACTITIONER

## 2017-03-17 PROCEDURE — 80053 COMPREHEN METABOLIC PANEL: CPT | Performed by: NURSE PRACTITIONER

## 2017-03-17 ASSESSMENT — PAIN SCALES - GENERAL: PAINLEVEL: MILD PAIN (3)

## 2017-03-17 NOTE — NURSING NOTE
"Matthew Stover is a 51 year old female who presents for:  Chief Complaint   Patient presents with     Oncology Clinic Visit     follow up        Initial Vitals:  BP (!) 140/96  Pulse 89  Temp 98.5  F (36.9  C)  Resp 15  Ht 1.651 m (5' 5\")  Wt 101.6 kg (224 lb)  SpO2 96%  BMI 37.28 kg/m2 Estimated body mass index is 37.28 kg/(m^2) as calculated from the following:    Height as of this encounter: 1.651 m (5' 5\").    Weight as of this encounter: 101.6 kg (224 lb).. Body surface area is 2.16 meters squared. BP completed using cuff size: regular  Mild Pain (3) No LMP recorded. Patient has had an ablation. Allergies and medications reviewed.     Medications: Medication refills not needed today.  Pharmacy name entered into [facility]: [id number] [place], [place] - [address] [address]    Comments:     5 minutes for nursing intake (face to face time)   Tonie Buitrago LPN        "

## 2017-03-17 NOTE — PROGRESS NOTES
Oncology Follow Up Visit: March 17, 2017    Oncologist: Dr Wisam Lion  PCP: Mal Ladd    Diagnosis: Stage IIIB or IIIc adenocarcinoma of the rectum  Matthew Stover is a 50 yo  female that shares that she had changes in her stools over last 6-8 months with gravel like stools happening frequently and eventually seeing blood in stools. She was also feeling lower abdominal pain and noting some vomiting happening intermittently with 12 lb weight loss over short term. 2/21/2017 EGD and colonoscopy found 5 cm distal rectal mass involving the 50% of the circumference of the rectum with biopsy proving moderately differentiated adenocarcinoma- DNA mismatch repair enzyme immunohistochemistry was intact. Further staging with CT CAP and MRI found no convincing evidence of distant metastatic disease.   T3 N2 per MRI  Treatment:   3/20/2017 to start treatment with Xeloda 2000mg in AM and 1500mg in PM along with radiotherapy.     Interval History: Ms. Stover comes to clinic with  for review prior to starting treatment with Xeloda and radiotherapy at start of week. Pt and family were off to trip to [place] to see family and have just returned. They have not told there 2 teens about the diagnosis and treatment- they will be doing this today. Pt shares she is nervous about starting treatment. She is having pain ranked 3/10 to lower abdomen- still having small gravel like stools frequently and is seeing blood in stools- she is not treating the pain. She admits to occasional vomiting which is random but happening at least weekly. She also is sleeping poorly over last week or more- worried about issues and having to get up frequently to use restroom . She had oblation and has not had a period since procedure- denies hot flashes. No recent illness. Does nto smoke. No SOB, chest pain, mouth sores or weakness.   Rest of comprehensive and complete ROS is reviewed and is negative.   Past Medical History   Diagnosis Date  "    Adenoma 01/28/2016     Right nipple     Rectal cancer (H) 02/21/2017     Current Outpatient Prescriptions   Medication     LORazepam (ATIVAN) 0.5 MG tablet     prochlorperazine (COMPAZINE) 10 MG tablet     capecitabine (XELODA) 500 MG tablet CHEMO     No current facility-administered medications for this visit.      No Known Allergies    Physical Exam: BP (!) 140/96  Pulse 89  Temp 98.5  F (36.9  C)  Resp 15  Ht 1.651 m (5' 5\")  Wt 101.6 kg (224 lb)  SpO2 96%  BMI 37.28 kg/m2   ECOG PS- 0  Constitutional: Alert, obese, and in no distress though is teary when discussing disease.    ENT: Eyes bright , No mouth sores  Neck: Supple, No adenopathy.Thyroid symmetric  Cardiac: Heart rate and rhythm is regular and strong without murmur  Respiratory: Breathing easy. Lung sounds clear to auscultation  GI: Abdomen is soft, mildly tender to low abdomen/pelvic region BS normal. No masses or organomegaly  MS: Muscle tone normal, extremities normal with no edema.   Skin: No suspicious lesions or rashes  Neuro: Sensory grossly WNL, gait normal.   Lymph: Normal ant/post cervical, axillary, supraclavicular nodes  Psych: Mentation appears normal and affect quiet but getting questions answered.     Laboratory Results:   Results for orders placed or performed in visit on 03/17/17   *CBC with platelets differential   Result Value Ref Range    WBC 7.4 4.0 - 11.0 10e9/L    RBC Count 5.01 3.8 - 5.2 10e12/L    Hemoglobin 14.5 11.7 - 15.7 g/dL    Hematocrit 43.0 35.0 - 47.0 %    MCV 86 78 - 100 fl    MCH 28.9 26.5 - 33.0 pg    MCHC 33.7 31.5 - 36.5 g/dL    RDW 13.2 10.0 - 15.0 %    Platelet Count 277 150 - 450 10e9/L    Diff Method Automated Method     % Neutrophils 63.5 %    % Lymphocytes 25.6 %    % Monocytes 7.3 %    % Eosinophils 3.1 %    % Basophils 0.5 %    Absolute Neutrophil 4.7 1.6 - 8.3 10e9/L    Absolute Lymphocytes 1.9 0.8 - 5.3 10e9/L    Absolute Monocytes 0.5 0.0 - 1.3 10e9/L    Absolute Eosinophils 0.2 0.0 - 0.7 10e9/L    " Absolute Basophils 0.0 0.0 - 0.2 10e9/L   Comprehensive metabolic panel   Result Value Ref Range    Sodium 141 133 - 144 mmol/L    Potassium 3.5 3.4 - 5.3 mmol/L    Chloride 104 94 - 109 mmol/L    Carbon Dioxide 33 (H) 20 - 32 mmol/L    Anion Gap 4 3 - 14 mmol/L    Glucose 108 (H) 70 - 99 mg/dL    Urea Nitrogen 9 7 - 30 mg/dL    Creatinine 1.20 (H) 0.52 - 1.04 mg/dL    GFR Estimate 48 (L) >60 mL/min/1.7m2    GFR Estimate If Black 58 (L) >60 mL/min/1.7m2    Calcium 9.3 8.5 - 10.1 mg/dL    Bilirubin Total 0.5 0.2 - 1.3 mg/dL    Albumin 3.6 3.4 - 5.0 g/dL    Protein Total 7.6 6.8 - 8.8 g/dL    Alkaline Phosphatase 75 40 - 150 U/L    ALT 21 0 - 50 U/L    AST 14 0 - 45 U/L     Assessment and Plan:   Stage IIIB or IIIC Rectal Cancer- Pt will begin neoadjuvant treatment with Xeloda and Radiation therapy on 3/20. Today we reviewed administration of the medication with dosing and timing of the medication that will be picked up today at pharmacy. We reviewed potential side effects and asked that she use hand cream to hands and feet bid. Potential for nausea review and compazine and ativan were reviewed as well and will be obtained today as well. She may use the ativan for help with sleep if necessary. Talked about ways to prevent missed doses.   Pt will return for start of radiation therapy on 3/20 and will be seen in clinic for review on 3/27 with labs to include CBC and CMP with this provider.   Pt stated agreement with plan and start of chemoradiation on 3/20/2017.   * plan to reimage with CT CAP 6 weeks after chemoradiation is completed with goal to proceed with surgery and follow with additional chemotherapy.   - will also get appt for genetic counselor for end of treatment.   Nutrition and weight loss- pt has seen some weight loss prior to diagnosis. She has been recommended to use supplements to maintain adequate nutrition and prevent weight loss. Suggested attendance at nutrition meeting with dietician.   Elevated  Creatinine- This is the first reading of kidney function taken and pt feels she has not been taking fluids well with vacation and plane ride today. Discussed need to push fluids and get in at least 8 glasses of fluids daily. We will keep monitoring results with treatment.    This was a 30 min visit with > 50% in counseling and coordinating care including education and management of concerns.    Mitzi Benites,CNP

## 2017-03-17 NOTE — MR AVS SNAPSHOT
After Visit Summary   3/17/2017    Matthew Stover    MRN: [id number]           Patient Information     Date Of Birth          1965        Visit Information        Provider Department      3/17/2017 2:45 PM Mitzi Benites APRN CNP [facility]        Today's Diagnoses     Rectal cancer (H)    -  1    Loss of weight        Elevated serum creatinine           Follow-ups after your visit        Additional Services     CANCER RISK MGMT/CANCER GENETIC COUNSELING REFERRAL       Your provider has referred you to the Cancer Risk Management Program - Cancer Genetic Counseling.    Reason for Referral:rectal cancer diagnosed age 51.     We have a sent a notice to a staff member of the Cancer Risk Management Program to give you a call to assist with scheduling your appointment.  You may also call  [phone] ([phone]) to initiate scheduling.    Please be aware that coverage of these services is subject to the terms and limitations of your health insurance plan.  Call member services at your health plan with any benefit or coverage questions.      Please bring the completed family history sheet to your appointment in addition to any available outside medical records documenting your cancer diagnosis.                  Your next 10 appointments already scheduled     Mar 20, 2017  1:30 PM CDT   Verification Sim with Joce Lovelace MD, RADIATION THERAPIST   [facility])    [address] [facility] [address] [phone]            Mar 21, 2017  3:15 PM CDT   TREATMENT with RADIATION THERAPIST   [facility])    [address] [facility] [address] [phone]            Mar 22, 2017  3:15 PM CDT   TREATMENT with RADIATION THERAPIST   [facility])    [address] [place] [address]    [phone]            Mar 23, 2017  3:15 PM CDT   TREATMENT with RADIATION THERAPIST   [facility]    [address] [facility] [address] [phone]            Mar 23, 2017  3:30 PM CDT   on treatment visit with Joce Lovelace MD   [facility]    [address] [facility] [address] [phone]            Mar 24, 2017  3:15 PM CDT   TREATMENT with RADIATION THERAPIST   [facility]    [address] [facility] [address] [phone]            Mar 27, 2017  2:45 PM CDT   LAB with LAB ONC [facility])    [address] [facility] [address] [phone]           Patient must bring picture ID.  Patient should be prepared to give a urine specimen  Please do not eat 10-12 hours before your appointment if you are coming in fasting for labs on lipids, cholesterol, or glucose (sugar).  Pregnant women should follow their Care Team instructions. Water with medications is okay. Do not drink coffee or other fluids.   If you have concerns about taking  your medications, please ask at office or if scheduling via [facility], send a message by clicking on Secure Messaging, Message Your Care Team.            Mar 27, 2017  3:15 PM CDT   TREATMENT with RADIATION THERAPIST   [facility]    [address] [facility] [address] [phone]            Mar 27, 2017  3:15 PM CDT   Return Visit with NOELLE Bliss CNP   [facility])    [address] [facility] [address] [phone]              Who to contact     If you have questions or need follow up information about today's clinic visit or your schedule please contact [facility]  "directly at [phone].  Normal or non-critical lab and imaging results will be communicated to you by kSARIAhart, letter or phone within 4 business days after the clinic has received the results. If you do not hear from us within 7 days, please contact the clinic through kSARIAhart or phone. If you have a critical or abnormal lab result, we will notify you by phone as soon as possible.  Submit refill requests through [employer] or call your pharmacy and they will forward the refill request to us. Please allow 3 business days for your refill to be completed.          Additional Information About Your Visit        [employer] Information     [employer] is an electronic gateway that provides easy, online access to your medical records. With [employer], you can request a clinic appointment, read your test results, renew a prescription or communicate with your care team.     To sign up for [employer] visit the website at [email or web address]   You will be asked to enter the access code listed below, as well as some personal information. Please follow the directions to create your username and password.     Your access code is: [email or web address]  Expires: 2017 11:06 AM     Your access code will  in 90 days. If you need help or a new code, please contact your [facility] or call [phone] for assistance.        Care EveryWhere ID     This is your Care EveryWhere ID. This could be used by other organizations to access your [place] medical records  [id number]        Your Vitals Were     Pulse Temperature Respirations Height Pulse Oximetry BMI (Body Mass Index)    89 98.5  F (36.9  C) 15 1.651 m (5' 5\") 96% 37.28 kg/m2       Blood Pressure from Last 3 Encounters:   17 (!) 140/96   17 127/86   17 127/86    Weight from Last 3 Encounters:   17 101.6 kg (224 lb)   17 102.5 kg (226 lb)   17 102.8 kg (226 lb 9.6 oz)              We Performed the Following     CANCER " RISK MGMT/CANCER GENETIC COUNSELING REFERRAL        Primary Care Provider Office Phone # Fax #    Mal Ladd [phone]       [address] [address]        Thank you!     Thank you for choosing [facility] [[email or web address]]  for your care. Our goal is always to provide you with excellent care. Hearing back from our patients is one way we can continue to improve our services. Please take a few minutes to complete the written survey that you may receive in the mail after your visit with us. Thank you!             Your Updated Medication List - Protect others around you: Learn how to safely use, store and throw away your medicines at www.disposemymeds.org.          This list is accurate as of: 3/17/17  4:20 PM.  Always use your most recent med list.                   Brand Name Dispense Instructions for use    capecitabine 500 MG tablet CHEMO    XELODA    196 tablet    Take 4 tablets (2000mg) in the AM and 3 tablets (1500mg) in the PM. Take for 5 days per week Monday-Friday. Do NOT take on Saturday-Sunday. Take with water within 30 minutes after meal.       LORazepam 0.5 MG tablet    ATIVAN    30 tablet    Take 1 tablet (0.5 mg) by mouth every 4 hours as needed (Anxiety, Nausea/Vomiting or Sleep)       prochlorperazine 10 MG tablet    COMPAZINE    30 tablet    Take 1 tablet (10 mg) by mouth every 6 hours as needed (Nausea/Vomiting)

## 2017-03-20 ENCOUNTER — APPOINTMENT (OUTPATIENT)
Dept: RADIATION ONCOLOGY | Facility: CLINIC | Age: 52
End: 2017-03-20
Payer: COMMERCIAL

## 2017-03-20 ENCOUNTER — CARE COORDINATION (OUTPATIENT)
Dept: ONCOLOGY | Facility: CLINIC | Age: 52
End: 2017-03-20

## 2017-03-20 PROCEDURE — 77412 RADIATION TX DELIVERY LVL 3: CPT | Performed by: RADIOLOGY

## 2017-03-20 PROCEDURE — 77280 THER RAD SIMULAJ FIELD SMPL: CPT | Performed by: RADIOLOGY

## 2017-03-20 NOTE — PROGRESS NOTES
Patient called to ask if she needed to take her Xeloda at the same time every day - especially evening dose.  Advised that she could take Xeloda within a 1 to 2 hour window frame.

## 2017-03-21 ENCOUNTER — APPOINTMENT (OUTPATIENT)
Dept: RADIATION ONCOLOGY | Facility: CLINIC | Age: 52
End: 2017-03-21
Payer: COMMERCIAL

## 2017-03-21 PROCEDURE — 77412 RADIATION TX DELIVERY LVL 3: CPT | Performed by: RADIOLOGY

## 2017-03-22 ENCOUNTER — APPOINTMENT (OUTPATIENT)
Dept: RADIATION ONCOLOGY | Facility: CLINIC | Age: 52
End: 2017-03-22
Payer: COMMERCIAL

## 2017-03-22 PROCEDURE — 77412 RADIATION TX DELIVERY LVL 3: CPT | Performed by: RADIOLOGY

## 2017-03-23 ENCOUNTER — CARE COORDINATION (OUTPATIENT)
Dept: ONCOLOGY | Facility: CLINIC | Age: 52
End: 2017-03-23

## 2017-03-23 ENCOUNTER — DOCUMENTATION ONLY (OUTPATIENT)
Dept: LAB | Facility: CLINIC | Age: 52
End: 2017-03-23

## 2017-03-23 ENCOUNTER — OFFICE VISIT (OUTPATIENT)
Dept: RADIATION ONCOLOGY | Facility: CLINIC | Age: 52
End: 2017-03-23
Payer: COMMERCIAL

## 2017-03-23 ENCOUNTER — TELEPHONE (OUTPATIENT)
Dept: PHARMACY | Facility: CLINIC | Age: 52
End: 2017-03-23

## 2017-03-23 ENCOUNTER — APPOINTMENT (OUTPATIENT)
Dept: RADIATION ONCOLOGY | Facility: CLINIC | Age: 52
End: 2017-03-23
Payer: COMMERCIAL

## 2017-03-23 VITALS — WEIGHT: 224 LBS | BODY MASS INDEX: 37.28 KG/M2

## 2017-03-23 DIAGNOSIS — C20 MALIGNANT NEOPLASM OF RECTUM (H): Primary | ICD-10-CM

## 2017-03-23 PROCEDURE — 99207 ZZC NO BILLABLE SERVICE THIS VISIT: CPT | Performed by: RADIOLOGY

## 2017-03-23 PROCEDURE — 77014 ZZHC CT GUIDE FOR PLACEMENT RADIATION THERAPY FIELDS: CPT | Performed by: RADIOLOGY

## 2017-03-23 PROCEDURE — 77412 RADIATION TX DELIVERY LVL 3: CPT | Performed by: RADIOLOGY

## 2017-03-23 ASSESSMENT — PAIN SCALES - GENERAL: PAINLEVEL: NO PAIN (0)

## 2017-03-23 NOTE — ORAL ONC MGMT
Oral Chemotherapy Monitoring Program     Primary Oncologist: Dr. Wisam Lion  Primary Oncology Clinic: [facility]  Cancer Diagnosis: Rectal Cancer     Drug: capecitabine (Xeloda) 2000 mg in AM and 1500 mg in PM M-F during XRT  Start Date: 3/20/17  Expected duration of therapy: For 28 days during XRT M-F. Total of 56 doses     Drug Interaction Assessment: No significant drug interactions identified upon review.     Lab Monitoring Plan  C1D1+  CMP, CBC C1D15+   C1D29+ call   C1D8+ Call C1D22+ Call, CMP, CBC C1D36+        Subjective/Objective:  Matthew Stover is a 51 year old female called to followup on issues communicated to pharmacy staff from [facility]. Pt states she has nausea first thing in the AM.  She gets up and has a protein shake right away and then eats toast later on with her capecitabine.  She states the nausea is somewhat consistent during the day and into the evening.  She has not taken any antiemetics for this.  She also complains of daily headaches, and has not taken medication for this.  Capecitabine headache SE is between 5-10%.  She also states she has some anxiety and wakes up multiple times in the evening and sometimes uses the restroom at this time.  She denies diarrhea and HFS at this time.  She actually stated her bowel are improved since starting XRT/capecitabine.  She has not missed any doses.     ORAL CHEMOTHERAPY 3/8/2017 3/23/2017   Drug Name Xeloda (Capecitabine) Xeloda (Capecitabine)   Current Dosage Other Other   Current Schedule BID BID   Cycle Details M-F only during XRT M-F only during XRT   Start Date of Last Cycle - 3/20/2017   Planned next cycle start date 3/20/2017 -   Doses missed in last 2 weeks - 0   Adherence Assessment - Adherent   Adverse Effects - Nausea   Pharmacist Intervention(nausea) - Yes   Intervention(s) - Patient education   Other (see note for details) - headache, sleep issues, anxiety   Pharmacist intervention? - Yes   Intervention(s) -  Rx medication recommendation   Any new drug interactions? No No   Is the dose as ordered appropriate for the patient? Yes -   Is the patient currently in pain? Assessed in last 30 days. -   Has the patient been assessed within the past 6 months for depression? Yes -   Has the patient missed any days of school, work, or other routine activity? - No     Assessment:  Pt having side effects from treatment including nausea, headaches, anxiety and sleep.      Plan:  1.  Headaches: pt told to take acetaminophen for headaches at this time.    2.  Nausea: we discussed at length using her antiemetics (lorazepam, prochlorperazine) that she has at home.  I instructed her to trial prochlorperazine to see if it causes drowsiness. She continues to work during treatment.  She will try it in the evening.  If successful she is to take it right away in the AM before her dose and again in the PM with her dose.  She was also instructed to take lorazepam at bedtime. If it causes too much drowsiness would recommend ondansetron during the day.   3. Sleep issues and anxiety: we discussed the use of lorazepam at night to help with sleep.  I instructed her that she could take this every night and it would help with nausea, anxiety and help her sleep.    Follow-Up:  Appt with Mitzi on 3/27 at 3:15pm with labs. Pt has 4 week supply of capecitabine and will need to get the remaining to complete her cycle on 4/17  Rebecca J. Fahrenbruch, PharmD, BCOP

## 2017-03-23 NOTE — PROGRESS NOTES
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 No     Patient demonstrates understanding of the following:  Reason for the appointment, diagnosis and treatment plan: Yes  Knowledge of proper use of medications and conditions for which they are ordered (with special attention to potential side effects or drug interactions): Yes  Which situations necessitate calling provider and whom to contact: Yes     Teaching Concerns Addressed:   Comments: All questions answered, no concerns at this time.     Instructional Materials Used/Given: Radiation Therapy and You Booklet, and fatigue, skin changes, nausea/vomiting, Diarrhea handouts all discussed and given to patient today.     Time spent with patient: 15 minutes.

## 2017-03-23 NOTE — MR AVS SNAPSHOT
After Visit Summary   3/23/2017    Matthew Stover    MRN: [id number]           Patient Information     Date Of Birth          1965        Visit Information        Provider Department      3/23/2017 3:30 PM Joce Lovelace MD [facility]        Today's Diagnoses     Malignant neoplasm of rectum (H)    -  1      Care Instructions    Please contact [facility] RN with questions or concerns following today's appointment: [phone].    Thank you!          Follow-ups after your visit        Your next 10 appointments already scheduled     Mar 24, 2017  3:15 PM CDT   TREATMENT with RADIATION THERAPIST   [facility])    [address] [facility] [address] [phone]            Mar 27, 2017  2:45 PM CDT   LAB with LAB ONC [facility])    [address] [place] [address] [phone]           Patient must bring picture ID.  Patient should be prepared to give a urine specimen  Please do not eat 10-12 hours before your appointment if you are coming in fasting for labs on lipids, cholesterol, or glucose (sugar).  Pregnant women should follow their Care Team instructions. Water with medications is okay. Do not drink coffee or other fluids.   If you have concerns about taking  your medications, please ask at office or if scheduling via [facility], send a message by clicking on Secure Messaging, Message Your Care Team.            Mar 27, 2017  3:15 PM CDT   TREATMENT with RADIATION THERAPIST   [facility])    [id number] [facility] [address] [phone]            Mar 27, 2017  3:15 PM CDT   Return Visit with NOELLE Bliss [facility])    [id number] [facility] [address] [phone]             Mar 28, 2017  2:45 PM CDT   TREATMENT with RADIATION THERAPIST   [facility]    [address] [facility] [address]   [phone]            Mar 29, 2017 12:00 PM CDT   TREATMENT with RADIATION THERAPIST   [facility])    [address] [facility] [address]   [phone]            Mar 29, 2017 12:15 PM CDT   on treatment visit with Radha Segovia MD   [facility])    [address] [facility] [address]   [phone]            Mar 30, 2017  3:15 PM CDT   TREATMENT with RADIATION THERAPIST   [facility])    [address] [facility] [address]   [phone]            Mar 31, 2017  3:15 PM CDT   TREATMENT with RADIATION THERAPIST   [facility])    [address] [facility] [address]   [phone]            Apr 03, 2017  3:15 PM CDT   TREATMENT with RADIATION THERAPIST   [facility])    [address] [facility] [address]   [phone]              Who to contact     If you have questions or need follow up information about today's clinic visit or your schedule please contact [facility] directly at [phone].  Normal or non-critical lab and imaging results will be communicated to you by MyChart, letter or phone within 4 business days after the clinic has received the results. If you do not hear from us within 7 days, please contact the clinic through MyChart or phone. If you have a critical or abnormal lab result, we will notify you by phone as soon as possible.  Submit refill requests through [employer] or call your pharmacy and they will forward the refill request to us. Please allow 3 business days for your refill to be completed.           Additional Information About Your Visit        [employer] Information     [employer] is an electronic gateway that provides easy, online access to your medical records. With [employer], you can request a clinic appointment, read your test results, renew a prescription or communicate with your care team.     To sign up for [employer] visit the website at [email or web address]   You will be asked to enter the access code listed below, as well as some personal information. Please follow the directions to create your username and password.     Your access code is: [email or web address]  Expires: 2017 11:06 AM     Your access code will  in 90 days. If you need help or a new code, please contact your [facility] or call [phone] for assistance.        Care EveryWhere ID     This is your Care EveryWhere ID. This could be used by other organizations to access your [place] medical records  [id number]        Your Vitals Were     BMI (Body Mass Index)                   37.28 kg/m2            Blood Pressure from Last 3 Encounters:   17 (!) 140/96   17 127/86   17 127/86    Weight from Last 3 Encounters:   17 224 lb   17 224 lb   17 226 lb              Today, you had the following     No orders found for display       Primary Care Provider Office Phone # Fax #    Mla Ladd [phone]       [address] [address]        Thank you!     Thank you for choosing [facility]  for your care. Our goal is always to provide you with excellent care. Hearing back from our patients is one way we can continue to improve our services. Please take a few minutes to complete the written survey that you may receive in the mail after your visit with us. Thank you!             Your Updated Medication List - Protect others around you: Learn how to safely use, store and throw away your medicines at www.disposemymeds.org.           This list is accurate as of: 3/23/17 11:59 PM.  Always use your most recent med list.                   Brand Name Dispense Instructions for use    capecitabine 500 MG tablet CHEMO    XELODA    196 tablet    Take 4 tablets (2000mg) in the AM and 3 tablets (1500mg) in the PM. Take for 5 days per week Monday-Friday. Do NOT take on Saturday-Sunday. Take with water within 30 minutes after meal.       LORazepam 0.5 MG tablet    ATIVAN    30 tablet    Take 1 tablet (0.5 mg) by mouth every 4 hours as needed (Anxiety, Nausea/Vomiting or Sleep)       prochlorperazine 10 MG tablet    COMPAZINE    30 tablet    Take 1 tablet (10 mg) by mouth every 6 hours as needed (Nausea/Vomiting)

## 2017-03-23 NOTE — PROGRESS NOTES
Spoke with patient about her FMLA and she wanted to report that she is having daily headaches (writer checked with pharmacy - headaches are about a 10% incidence) mostly in the afternoon behind her left eye.  She states she is trying to drink fluids.  She is also having daily nausea, vomited x1 last evening.  She is not taking her Compazine;wants to see how she does without medication; doesn't like taking medication.  Asking if she can use Advil instead of Tylenol for headaches - she states Advil does not upset her stomach as much as Tylenol.  Advised to try Compazine; drink more fluids.  Will get back to her about using Advil vs Tylenol.  Pharmacy team will also give her a call.

## 2017-03-23 NOTE — PROGRESS NOTES
Received message from RHETT Cartagena addressing questions below regarding what she can take for headache.  RHETT Cartagena advised to writer that she can try Advil, but if headaches continue she should report it to us.  Plan for now, per Cecily in pharmacy is that she will try Tylenol first as she has used this in the past for her headaches.  Will advise patient of above.

## 2017-03-23 NOTE — PATIENT INSTRUCTIONS
Please contact [facility] RN with questions or concerns following today's appointment: [phone].    Thank you!

## 2017-03-24 ENCOUNTER — CARE COORDINATION (OUTPATIENT)
Dept: ONCOLOGY | Facility: CLINIC | Age: 52
End: 2017-03-24

## 2017-03-24 ENCOUNTER — APPOINTMENT (OUTPATIENT)
Dept: RADIATION ONCOLOGY | Facility: CLINIC | Age: 52
End: 2017-03-24
Payer: COMMERCIAL

## 2017-03-24 PROCEDURE — 77427 RADIATION TX MANAGEMENT X5: CPT | Performed by: RADIOLOGY

## 2017-03-24 PROCEDURE — 77412 RADIATION TX DELIVERY LVL 3: CPT | Performed by: RADIOLOGY

## 2017-03-24 PROCEDURE — 77336 RADIATION PHYSICS CONSULT: CPT | Performed by: RADIOLOGY

## 2017-03-24 NOTE — PROGRESS NOTES
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 Lion  Drug Name/Frequency 1: Xeloda    Subjective: Just starting radiation. No issues so far. Had one episode of nausea otherwise no issues.  Has meds for nausea.    Nursing ROS:   Nutrition Alteration  Diet Type: Patient's Preference  Skin  Skin Reaction: 0 - No changes        Cardiovascular  Respiratory effort: 1 - Normal - without distress  Gastrointestinal  Nausea: 1 - One to two episodes of nausea/24  Vomitin - One episode in 24 hours (ons)  GI Note: Reports intermittent n/v in the a.m., eating helps, now using anti-nausea medication which also helps  Genitourinary  Urinary Status: 0 - Normal  Psychosocial  Mood - Anxiety: 0 - Normal  Mood - Depression: 0 - Normal  Pyschosocial Note: energy level at baseline  Pain Assessment  0-10 Pain Scale: 0    Objective:   Wt 224 lb  BMI 37.28 kg/m2  Gen: Appears well, NAD  Skin: No erythema    Labs:  CBC RESULTS:   Recent Labs   Lab Test  17   1418   WBC  7.4   RBC  5.01   HGB  14.5   HCT  43.0   MCV  86   MCH  28.9   MCHC  33.7   RDW  13.2   PLT  277     ELECTROLYTES:  Recent Labs   Lab Test  17   1418   NA  141   POTASSIUM  3.5   CHLORIDE  104   MG  9.3   CO2  33*   BUN  9   CR  1.20*   GLC  108*       Assessment:    Tolerating radiation therapy well.  All questions and concerns addressed.    Plan:   1. Continue current therapy.    2. Nausea: Will start taking nausea medications prn.      Mosaiq chart and setup information reviewed  IGRT images  reviewed    Medication Review  Med list reviewed with patient?: Yes  Med list printed and given: Offered and declined    Educational Topic Discussed  Additional Instructions: To follow up with Mitzi with labs on 3/27/17.  Education Instructions: Radiation Therapy and You Booklet, and fatigue, skin changes, nausea/vomiting, Diarrhea handouts all discussed and given to patient today. Also gave medium sized dilator and discussed instructions on usage.      Joce Lovelace M.D.  [facility]  Radiation Oncology    [phone] clinic  [phone] pager

## 2017-03-24 NOTE — PROGRESS NOTES
Form Request Documentation    Date Received in Clinic:  03/21/17  Mode Received:  Fax  Name/Type of Form: [place] Medical Certification for intermittent leave of absence - [employer]  Date Completed: 03/24/17  Disposition of Form:  Completed form faxed to [employer] at fax number [phone].  Form labeled for EMR scanning.    Rao Chavez RN, BSN, OCN

## 2017-03-27 ENCOUNTER — ONCOLOGY VISIT (OUTPATIENT)
Dept: ONCOLOGY | Facility: CLINIC | Age: 52
End: 2017-03-27
Payer: COMMERCIAL

## 2017-03-27 ENCOUNTER — DOCUMENTATION ONLY (OUTPATIENT)
Dept: PHARMACY | Facility: CLINIC | Age: 52
End: 2017-03-27

## 2017-03-27 ENCOUNTER — APPOINTMENT (OUTPATIENT)
Dept: RADIATION ONCOLOGY | Facility: CLINIC | Age: 52
End: 2017-03-27
Payer: COMMERCIAL

## 2017-03-27 VITALS
HEIGHT: 65 IN | RESPIRATION RATE: 20 BRPM | TEMPERATURE: 97.9 F | BODY MASS INDEX: 37.99 KG/M2 | SYSTOLIC BLOOD PRESSURE: 142 MMHG | DIASTOLIC BLOOD PRESSURE: 96 MMHG | OXYGEN SATURATION: 97 % | WEIGHT: 228 LBS | HEART RATE: 105 BPM

## 2017-03-27 DIAGNOSIS — R79.89 ELEVATED SERUM CREATININE: ICD-10-CM

## 2017-03-27 DIAGNOSIS — C20 MALIGNANT NEOPLASM OF RECTUM (H): Primary | ICD-10-CM

## 2017-03-27 DIAGNOSIS — R11.0 NAUSEA: ICD-10-CM

## 2017-03-27 LAB
ALBUMIN SERPL-MCNC: 3.5 G/DL (ref 3.4–5)
ALP SERPL-CCNC: 68 U/L (ref 40–150)
ALT SERPL W P-5'-P-CCNC: 18 U/L (ref 0–50)
ANION GAP SERPL CALCULATED.3IONS-SCNC: 6 MMOL/L (ref 3–14)
AST SERPL W P-5'-P-CCNC: 16 U/L (ref 0–45)
BASOPHILS # BLD AUTO: 0 10E9/L (ref 0–0.2)
BASOPHILS NFR BLD AUTO: 0.5 %
BILIRUB SERPL-MCNC: 0.6 MG/DL (ref 0.2–1.3)
BUN SERPL-MCNC: 12 MG/DL (ref 7–30)
CALCIUM SERPL-MCNC: 8.7 MG/DL (ref 8.5–10.1)
CHLORIDE SERPL-SCNC: 105 MMOL/L (ref 94–109)
CO2 SERPL-SCNC: 27 MMOL/L (ref 20–32)
CREAT SERPL-MCNC: 0.78 MG/DL (ref 0.52–1.04)
DIFFERENTIAL METHOD BLD: NORMAL
EOSINOPHIL # BLD AUTO: 0.2 10E9/L (ref 0–0.7)
EOSINOPHIL NFR BLD AUTO: 2.4 %
ERYTHROCYTE [DISTWIDTH] IN BLOOD BY AUTOMATED COUNT: 13 % (ref 10–15)
GFR SERPL CREATININE-BSD FRML MDRD: 79 ML/MIN/1.7M2
GLUCOSE SERPL-MCNC: 92 MG/DL (ref 70–99)
HCT VFR BLD AUTO: 40.5 % (ref 35–47)
HGB BLD-MCNC: 13.9 G/DL (ref 11.7–15.7)
LYMPHOCYTES # BLD AUTO: 1.2 10E9/L (ref 0.8–5.3)
LYMPHOCYTES NFR BLD AUTO: 18.4 %
MCH RBC QN AUTO: 29 PG (ref 26.5–33)
MCHC RBC AUTO-ENTMCNC: 34.3 G/DL (ref 31.5–36.5)
MCV RBC AUTO: 85 FL (ref 78–100)
MONOCYTES # BLD AUTO: 0.4 10E9/L (ref 0–1.3)
MONOCYTES NFR BLD AUTO: 6.1 %
NEUTROPHILS # BLD AUTO: 4.6 10E9/L (ref 1.6–8.3)
NEUTROPHILS NFR BLD AUTO: 72.6 %
PLATELET # BLD AUTO: 248 10E9/L (ref 150–450)
POTASSIUM SERPL-SCNC: 3.9 MMOL/L (ref 3.4–5.3)
PROT SERPL-MCNC: 7.4 G/DL (ref 6.8–8.8)
RBC # BLD AUTO: 4.79 10E12/L (ref 3.8–5.2)
SODIUM SERPL-SCNC: 138 MMOL/L (ref 133–144)
WBC # BLD AUTO: 6.4 10E9/L (ref 4–11)

## 2017-03-27 PROCEDURE — 80053 COMPREHEN METABOLIC PANEL: CPT | Performed by: INTERNAL MEDICINE

## 2017-03-27 PROCEDURE — 77412 RADIATION TX DELIVERY LVL 3: CPT | Performed by: RADIOLOGY

## 2017-03-27 PROCEDURE — 36415 COLL VENOUS BLD VENIPUNCTURE: CPT | Performed by: INTERNAL MEDICINE

## 2017-03-27 PROCEDURE — 99207 ZZC NO CHARGE LOS: CPT

## 2017-03-27 PROCEDURE — 99214 OFFICE O/P EST MOD 30 MIN: CPT | Performed by: NURSE PRACTITIONER

## 2017-03-27 PROCEDURE — 85025 COMPLETE CBC W/AUTO DIFF WBC: CPT | Performed by: INTERNAL MEDICINE

## 2017-03-27 ASSESSMENT — PAIN SCALES - GENERAL: PAINLEVEL: NO PAIN (0)

## 2017-03-27 NOTE — PROGRESS NOTES
Oral Chemotherapy Monitoring Program    Primary Oncologist: Dr. Wisam Lion  Primary Oncology Clinic: [facility]  Cancer Diagnosis: Rectal Cancer    Drug: capecitabine (Xeloda) 2000 mg in AM and 1500 mg in PM M-F during XRT  Therapy History:  Start Date: 3/20/17  Expected duration of therapy: For 28 days during XRT M-F. Total of 56 doses.    Drug Interaction Assessment: No significant drug interactions identified upon review.    Lab Monitoring Plan  Day 22: CMP, CBC  Subjective/Objective:  Matthew Stover is a 51 year old female seen in clinic for a follow-up visit for oral chemotherapy.  Matthew states she is doing better. Her nausea has mostly resolved with taking her prochlorperazine each morning and take the capecitabine with food. He reports some minor tingling and changes in her fingers but denies any peeling or redness. She continues to use lotion at least 4x/day on her hands and 1-2x/day on her feet.     ORAL CHEMOTHERAPY 3/8/2017 3/23/2017 3/27/2017   Drug Name Xeloda (Capecitabine) Xeloda (Capecitabine) Xeloda (Capecitabine)   Current Dosage Other Other Other   Current Schedule BID BID BID   Cycle Details M-F only during XRT M-F only during XRT M-F only during XRT   Start Date of Last Cycle - 3/20/2017 3/20/2017   Planned next cycle start date 3/20/2017 - -   Doses missed in last 2 weeks - 0 0   Adherence Assessment - Adherent Adherent   Adverse Effects - Nausea Nausea;Palmar-plantar erythrodysethesia syndrome   Nausea - - Resolved due to intervention   Pharmacist Intervention(nausea) - Yes -   Intervention(s) - Patient education -   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 - Yes -   Intervention(s) - Rx medication recommendation -   Any new drug interactions? No No No   Is the dose as ordered appropriate  "for the patient? Yes - -   Is the patient currently in pain? Assessed in last 30 days. - Assessed in last 30 days.   Has the patient been assessed within the past 6 months for depression? Yes - Yes   Has the patient missed any days of school, work, or other routine activity? - No -       Vitals:  BP:   BP Readings from Last 1 Encounters:   03/27/17 (!) 142/96     Wt Readings from Last 1 Encounters:   03/27/17 103.4 kg (228 lb)     Estimated body surface area is 2.18 meters squared as calculated from the following:    Height as of an earlier encounter on 3/27/17: 1.651 m (5' 5\").    Weight as of an earlier encounter on 3/27/17: 103.4 kg (228 lb).    Labs:  Lab Results   Component Value Date     03/27/2017      Lab Results   Component Value Date    POTASSIUM 3.9 03/27/2017     Lab Results   Component Value Date    MG 8.7 03/27/2017     Lab Results   Component Value Date    ALBUMIN 3.5 03/27/2017       Lab Results   Component Value Date    BUN 12 03/27/2017     Lab Results   Component Value Date    CR 0.78 03/27/2017       Lab Results   Component Value Date    AST 16 03/27/2017     Lab Results   Component Value Date    ALT 18 03/27/2017     Lab Results   Component Value Date    BILITOTAL 0.6 03/27/2017       Lab Results   Component Value Date    WBC 6.4 03/27/2017     Lab Results   Component Value Date    HGB 13.9 03/27/2017     Lab Results   Component Value Date     03/27/2017     Lab Results   Component Value Date    ANEU 4.6 03/27/2017       Assessment:  Matthew is tolerating therapy with some side effects that are being managed with pharmacologic and nonpharmacologic therapy.     Plan:  Continue current therapy.     Follow-Up:  Appt with Dr. Lion on 4/12 at 4:15pm    Refill Due:  No refills to be released. Script for full amount at [facility]    Mari Stokes Pharm. D.      "

## 2017-03-27 NOTE — PROGRESS NOTES
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 She was also feeling lower abdominal pain and noting some vomiting happening intermittently with 12 lb weight loss over short term. 2/21/2017 EGD and colonoscopy found 5 cm distal rectal mass involving the 50% of the circumference of the rectum with biopsy proving moderately differentiated adenocarcinoma- DNA mismatch repair enzyme immunohistochemistry was intact. Further staging with CT CAP and MRI found no convincing evidence of distant metastatic disease.   T3 N2 per MRI  Treatment:   3/20/2017 to start treatment with Xeloda 2000mg in AM and 1500mg in PM along with radiotherapy.     Interval History: Ms. Stover comes to clinic with  for review of symptoms while on treatment with with Xeloda and radiotherapy. Patient states she is feeling well over the weekend.  Has issues with fatigue and fingertip tenderness noted starting last week. She states she has not missed any Xeloda and is taking with food. She's had some nausea noted first thing in the morning and is taking an antibiotic that time then getting some additional food and without any problems the rest of the day. She has not had any bowel changes though initially saw some loose stools feels like there reverting back to more constipated state after weekend.She also had some weight loss last week but this week and this all back and is now back at normal She denies any pain mouth sores shortness of breath fevers or chills or other illness.   Rest of comprehensive and complete ROS is reviewed and is negative.   Past Medical History:   Diagnosis Date     Adenoma 01/28/2016    Right nipple     Rectal cancer (H)  "02/21/2017     Current Outpatient Prescriptions   Medication     LORazepam (ATIVAN) 0.5 MG tablet     prochlorperazine (COMPAZINE) 10 MG tablet     capecitabine (XELODA) 500 MG tablet CHEMO     No current facility-administered medications for this visit.      No Known Allergies    Physical Exam: BP (!) 142/96 (BP Location: Right arm, Patient Position: Chair, Cuff Size: Adult Large)  Pulse 105  Temp 97.9  F (36.6  C) (Oral)  Resp 20  Ht 1.651 m (5' 5\")  Wt 103.4 kg (228 lb)  SpO2 97%  BMI 37.94 kg/m2 - weight back to baseline  ECOG PS- 0  Constitutional: Alert, obese, and in no distress though is teary when discussing disease.    ENT: Eyes bright , No mouth sores  Neck: Supple, No adenopathy.Thyroid symmetric  Cardiac: Heart rate and rhythm is regular and strong without murmur  Respiratory: Breathing easy. Lung sounds clear to auscultation  GI: Abdomen is soft, mildly tender to low abdomen/pelvic region BS normal. No masses or organomegaly  MS: Muscle tone normal, extremities normal with no edema.   Skin: No suspicious lesions or rashes- No peeling just mild tenderness to the fingertips. Foot exam shows no peeling no tenderness no issues to the feet or toes.  Neuro: Sensory grossly WNL, gait normal.   Lymph: Normal ant/post cervical, axillary, supraclavicular nodes  Psych: Mentation appears normal and affect quiet but getting questions answered.     Laboratory Results:   Results for orders placed or performed in visit on 03/17/17   *CBC with platelets differential   Result Value Ref Range    WBC 7.4 4.0 - 11.0 10e9/L    RBC Count 5.01 3.8 - 5.2 10e12/L    Hemoglobin 14.5 11.7 - 15.7 g/dL    Hematocrit 43.0 35.0 - 47.0 %    MCV 86 78 - 100 fl    MCH 28.9 26.5 - 33.0 pg    MCHC 33.7 31.5 - 36.5 g/dL    RDW 13.2 10.0 - 15.0 %    Platelet Count 277 150 - 450 10e9/L    Diff Method Automated Method     % Neutrophils 63.5 %    % Lymphocytes 25.6 %    % Monocytes 7.3 %    % Eosinophils 3.1 %    % Basophils 0.5 %    " Absolute Neutrophil 4.7 1.6 - 8.3 10e9/L    Absolute Lymphocytes 1.9 0.8 - 5.3 10e9/L    Absolute Monocytes 0.5 0.0 - 1.3 10e9/L    Absolute Eosinophils 0.2 0.0 - 0.7 10e9/L    Absolute Basophils 0.0 0.0 - 0.2 10e9/L   Comprehensive metabolic panel   Result Value Ref Range    Sodium 141 133 - 144 mmol/L    Potassium 3.5 3.4 - 5.3 mmol/L    Chloride 104 94 - 109 mmol/L    Carbon Dioxide 33 (H) 20 - 32 mmol/L    Anion Gap 4 3 - 14 mmol/L    Glucose 108 (H) 70 - 99 mg/dL    Urea Nitrogen 9 7 - 30 mg/dL    Creatinine 1.20 (H) 0.52 - 1.04 mg/dL    GFR Estimate 48 (L) >60 mL/min/1.7m2    GFR Estimate If Black 58 (L) >60 mL/min/1.7m2    Calcium 9.3 8.5 - 10.1 mg/dL    Bilirubin Total 0.5 0.2 - 1.3 mg/dL    Albumin 3.6 3.4 - 5.0 g/dL    Protein Total 7.6 6.8 - 8.8 g/dL    Alkaline Phosphatase 75 40 - 150 U/L    ALT 21 0 - 50 U/L    AST 14 0 - 45 U/L     Assessment and Plan:   Stage IIIB or IIIC Rectal Cancer- Pt began neoadjuvant treatment with Xeloda and Radiation therapy on 3/20. She is seeing minimal symptoms of fatigue mild fingertip tenderness and morning nausea. She is able to continue with her Xeloda as ordered with food. Since all symptoms are treatable, she will continue with treatment as set. Reminded to lotion hands and feet several times daily to help prevent further skin issues.  She will return to see Dr. Lion on 412 with CBC and CMP.   * plan to reimage with CT CAP 6 weeks after chemoradiation is completed with goal to proceed with surgery and follow with additional chemotherapy.   - His appointment with genetic counselor for end of treatment on 5/10/2017.   Nutrition/Nausea. -Though initially saw some weight loss related to her diagnosis this week she has been back at her normal weight and feels she is eating well throughout the day with health intake. She is treating her morning nausea appropriately with Compazine which is working well.  Elevated Creatinine- Last week was seen to have an elevation in her  creatinine the patient admits she was not taking fluids well. Today she is back at normal without any problems. Did remind her to increase her fluids to maintain these  results and not stress kidneys.    This was a 25 minute visit for education and management of current concerns  Mitzi Benites CNP

## 2017-03-27 NOTE — NURSING NOTE
"Matthew Stover is a 51 year old female who presents for:  Chief Complaint   Patient presents with     Oncology Clinic Visit     f/u appt        Initial Vitals:  BP (!) 142/96 (BP Location: Right arm, Patient Position: Chair, Cuff Size: Adult Large)  Pulse 105  Temp 97.9  F (36.6  C) (Oral)  Resp 20  Ht 1.651 m (5' 5\")  Wt 103.4 kg (228 lb)  SpO2 97%  BMI 37.94 kg/m2 Estimated body mass index is 37.94 kg/(m^2) as calculated from the following:    Height as of this encounter: 1.651 m (5' 5\").    Weight as of this encounter: 103.4 kg (228 lb).. Body surface area is 2.18 meters squared. BP completed using cuff size: large  No Pain (0) No LMP recorded. Patient has had an ablation. Allergies and medications reviewed.     Medications: Medication refills not needed today.  Pharmacy name entered into [employer]: [id number] [id number] [address]    Comments:     8 minutes for nursing intake (face to face time)   ESTEFANÍA TRUONG LPN        "

## 2017-03-27 NOTE — MR AVS SNAPSHOT
After Visit Summary   3/27/2017    Matthew Stover    MRN: [id number]           Patient Information     Date Of Birth          1965        Visit Information        Provider Department      3/27/2017 3:15 PM Mitzi Benites APRN CNP [facility]        Today's Diagnoses     Malignant neoplasm of rectum (H)    -  1    Nausea        Elevated serum creatinine           Follow-ups after your visit        Your next 10 appointments already scheduled     Mar 28, 2017  2:45 PM CDT   TREATMENT with RADIATION THERAPIST   [facility]    [address] [facility] [address]   [phone]            Mar 29, 2017 12:00 PM CDT   TREATMENT with RADIATION THERAPIST   [facility]    [address] [facility] [address]   [phone]            Mar 29, 2017 12:15 PM CDT   on treatment visit with Radha Segovia MD   [facility]    [address] [facility] [address]   [phone]            Mar 30, 2017  3:15 PM CDT   TREATMENT with RADIATION THERAPIST   [facility]    [address] [facility] [address]   [phone]            Mar 31, 2017  3:15 PM CDT   TREATMENT with RADIATION THERAPIST   [facility]    [address] [facility] [address]   [phone]            Apr 03, 2017  3:15 PM CDT   TREATMENT with RADIATION THERAPIST   [facility]    [address] [facility] [address]   [phone]            Apr 04, 2017  3:15 PM CDT   TREATMENT with RADIATION THERAPIST   [facility]    [address] [facility] [address]   [phone]            Apr 05, 2017  3:15 PM CDT   TREATMENT  with RADIATION THERAPIST   [facility]    [address] [place] [address] [phone]            2017  3:15 PM CDT   TREATMENT with RADIATION THERAPIST   [facility]    [address] [place] [address]   [phone]            2017  3:30 PM CDT   on treatment visit with Joce Lovelace MD   [facility]    [address] [place] [address] [phone]              Who to contact     If you have questions or need follow up information about today's clinic visit or your schedule please contact [facility] directly at [phone].  Normal or non-critical lab and imaging results will be communicated to you by MyChart, letter or phone within 4 business days after the clinic has received the results. If you do not hear from us within 7 days, please contact the clinic through MyChart or phone. If you have a critical or abnormal lab result, we will notify you by phone as soon as possible.  Submit refill requests through FohBoh or call your pharmacy and they will forward the refill request to us. Please allow 3 business days for your refill to be completed.          Additional Information About Your Visit        FohBoh Information     FohBoh is an electronic gateway that provides easy, online access to your medical records. With FohBoh, you can request a clinic appointment, read your test results, renew a prescription or communicate with your care team.     To sign up for FohBoh visit the website at [email or web address]   You will be asked to enter the access code listed below, as well as some personal information. Please follow the directions to create your username and password.     Your access code is: [email or web address]  Expires: 2017 11:06 AM     Your access code will  in  "90 days. If you need help or a new code, please contact your [facility] or call [phone] for assistance.        Care EveryWhere ID     This is your Care EveryWhere ID. This could be used by other organizations to access your [facility] medical records  [id number]        Your Vitals Were     Pulse Temperature Respirations Height Pulse Oximetry BMI (Body Mass Index)    105 97.9  F (36.6  C) (Oral) 20 1.651 m (5' 5\") 97% 37.94 kg/m2       Blood Pressure from Last 3 Encounters:   03/27/17 (!) 142/96   03/17/17 (!) 140/96   03/08/17 127/86    Weight from Last 3 Encounters:   03/27/17 103.4 kg (228 lb)   03/23/17 101.6 kg (224 lb)   03/17/17 101.6 kg (224 lb)              Today, you had the following     No orders found for display       Primary Care Provider Office Phone # Fax #    Mal Ladd [phone]       [address] [address]        Thank you!     Thank you for choosing [facility]  for your care. Our goal is always to provide you with excellent care. Hearing back from our patients is one way we can continue to improve our services. Please take a few minutes to complete the written survey that you may receive in the mail after your visit with us. Thank you!             Your Updated Medication List - Protect others around you: Learn how to safely use, store and throw away your medicines at www.disposemymeds.org.          This list is accurate as of: 3/27/17  4:08 PM.  Always use your most recent med list.                   Brand Name Dispense Instructions for use    capecitabine 500 MG tablet CHEMO    XELODA    196 tablet    Take 4 tablets (2000mg) in the AM and 3 tablets (1500mg) in the PM. Take for 5 days per week Monday-Friday. Do NOT take on Saturday-Sunday. Take with water within 30 minutes after meal.       LORazepam 0.5 MG tablet    ATIVAN    30 tablet    Take 1 tablet (0.5 mg) by mouth every 4 hours as needed " (Anxiety, Nausea/Vomiting or Sleep)       prochlorperazine 10 MG tablet    COMPAZINE    30 tablet    Take 1 tablet (10 mg) by mouth every 6 hours as needed (Nausea/Vomiting)

## 2017-03-28 ENCOUNTER — APPOINTMENT (OUTPATIENT)
Dept: RADIATION ONCOLOGY | Facility: CLINIC | Age: 52
End: 2017-03-28
Payer: COMMERCIAL

## 2017-03-28 PROCEDURE — 77412 RADIATION TX DELIVERY LVL 3: CPT | Performed by: RADIOLOGY

## 2017-03-28 PROCEDURE — 77014 ZZHC CT GUIDE FOR PLACEMENT RADIATION THERAPY FIELDS: CPT | Performed by: RADIOLOGY

## 2017-03-29 ENCOUNTER — ONCOLOGY VISIT (OUTPATIENT)
Dept: RADIATION ONCOLOGY | Facility: CLINIC | Age: 52
End: 2017-03-29

## 2017-03-29 ENCOUNTER — OFFICE VISIT (OUTPATIENT)
Dept: RADIATION ONCOLOGY | Facility: CLINIC | Age: 52
End: 2017-03-29
Payer: COMMERCIAL

## 2017-03-29 ENCOUNTER — APPOINTMENT (OUTPATIENT)
Dept: RADIATION ONCOLOGY | Facility: CLINIC | Age: 52
End: 2017-03-29
Payer: COMMERCIAL

## 2017-03-29 VITALS — WEIGHT: 229 LBS | BODY MASS INDEX: 38.11 KG/M2

## 2017-03-29 DIAGNOSIS — C20 MALIGNANT NEOPLASM OF RECTUM (H): Primary | ICD-10-CM

## 2017-03-29 DIAGNOSIS — Z71.81 SPIRITUAL OR RELIGIOUS COUNSELING: Primary | ICD-10-CM

## 2017-03-29 PROCEDURE — 77412 RADIATION TX DELIVERY LVL 3: CPT | Performed by: RADIOLOGY

## 2017-03-29 PROCEDURE — 99207 ZZC NO BILLABLE SERVICE THIS VISIT: CPT | Performed by: RADIOLOGY

## 2017-03-29 ASSESSMENT — PAIN SCALES - GENERAL: PAINLEVEL: NO PAIN (0)

## 2017-03-29 NOTE — MR AVS SNAPSHOT
After Visit Summary   3/29/2017    Matthew Stover    MRN: [id number]           Patient Information     Date Of Birth          1965        Visit Information        Provider Department      3/29/2017 1:38 PM Ryan Espinal [facility]        Today's Diagnoses     Spiritual or Latter day counseling    -  1       Follow-ups after your visit        Your next 10 appointments already scheduled     Mar 30, 2017  3:15 PM CDT   TREATMENT with RADIATION THERAPIST   [facility] ([facility])    [address] [facility] [address]   [phone]            Mar 31, 2017  3:15 PM CDT   TREATMENT with RADIATION THERAPIST   [facility] ([facility])    [address] [facility] [address]   [phone]            Apr 03, 2017  3:15 PM CDT   TREATMENT with RADIATION THERAPIST   [facility] ([facility])    [address] [facility] [address]   [phone]            Apr 04, 2017  3:15 PM CDT   TREATMENT with RADIATION THERAPIST   [facility] ([facility])    [address] [facility] [address]   [phone]            Apr 05, 2017  3:15 PM CDT   TREATMENT with RADIATION THERAPIST   [facility] ([facility])    [address] [facility] [address]   [phone]            Apr 06, 2017  3:15 PM CDT   TREATMENT with RADIATION THERAPIST   [facility] ([facility])    [address] [facility] [address]   [phone]            Apr 06, 2017  3:30 PM CDT   on treatment visit with Joce Lovelace MD   [facility] ([facility])    [address] [facility] [address]   [phone]            Apr 07, 2017  8:15 AM CDT   TREATMENT with RADIATION THERAPIST   [facility]  ([facility])    [address] [address]   [phone]            Apr 10, 2017  3:15 PM CDT   TREATMENT with RADIATION THERAPIST   [facility] ([facility])    [address] [facility] [address] [phone]            2017  3:15 PM CDT   TREATMENT with RADIATION THERAPIST   [facility] ([facility])    [address] [address] [address] [phone]              Who to contact     If you have questions or need follow up information about today's clinic visit or your schedule please contact [facility] directly at [phone].  Normal or non-critical lab and imaging results will be communicated to you by Cuipohart, letter or phone within 4 business days after the clinic has received the results. If you do not hear from us within 7 days, please contact the clinic through Cuipohart or phone. If you have a critical or abnormal lab result, we will notify you by phone as soon as possible.  Submit refill requests through [employer] or call your pharmacy and they will forward the refill request to us. Please allow 3 business days for your refill to be completed.          Additional Information About Your Visit        Moped Information     M[employer] is an electronic gateway that provides easy, online access to your medical records. With M[employer], you can request a clinic appointment, read your test results, renew a prescription or communicate with your care team.     To sign up for M[employer] visit the website at [email or web address]   You will be asked to enter the access code listed below, as well as some personal information. Please follow the directions to create your username and password.     Your access code is: [email or web address]  Expires: 2017 11:06 AM     Your access code will  in 90 days. If you need help or a new code, please contact your [facility] or call [phone] for assistance.        Care EveryWhere ID     This is your Care EveryWhere ID. This could be used by other organizations to access your [facility] medical records  [id number]         Blood Pressure from Last 3 Encounters:   03/27/17 (!) 142/96   03/17/17 (!) 140/96   03/08/17 127/86    Weight from Last 3 Encounters:   03/29/17 103.9 kg (229 lb)   03/27/17 103.4 kg (228 lb)   03/23/17 101.6 kg (224 lb)              Today, you had the following     No orders found for display       Primary Care Provider Office Phone # Fax #    Mal Ladd [phone]       [address] [address]        Thank you!     Thank you for choosing [facility]  for your care. Our goal is always to provide you with excellent care. Hearing back from our patients is one way we can continue to improve our services. Please take a few minutes to complete the written survey that you may receive in the mail after your visit with us. Thank you!             Your Updated Medication List - Protect others around you: Learn how to safely use, store and throw away your medicines at www.disposemymeds.org.          This list is accurate as of: 3/29/17  1:47 PM.  Always use your most recent med list.                   Brand Name Dispense Instructions for use    capecitabine 500 MG tablet CHEMO    XELODA    196 tablet    Take 4 tablets (2000mg) in the AM and 3 tablets (1500mg) in the PM. Take for 5 days per week Monday-Friday. Do NOT take on Saturday-Sunday. Take with water within 30 minutes after meal.       LORazepam 0.5 MG tablet    ATIVAN    30 tablet    Take 1 tablet (0.5 mg) by mouth every 4 hours as needed (Anxiety, Nausea/Vomiting or Sleep)       prochlorperazine 10 MG tablet    COMPAZINE    30 tablet    Take 1 tablet (10 mg) by mouth every 6 hours as needed (Nausea/Vomiting)

## 2017-03-29 NOTE — MR AVS SNAPSHOT
After Visit Summary   3/29/2017    Matthew Stover    MRN: [id number]           Patient Information     Date Of Birth          1965        Visit Information        Provider Department      3/29/2017 12:15 PM Radha Segovia MD [facility]        Today's Diagnoses     Malignant neoplasm of rectum (H)    -  1      Care Instructions    Please contact [facility] RN with questions or concerns following today's appointment: [phone].    Thank you!          Follow-ups after your visit        Your next 10 appointments already scheduled     Mar 30, 2017  3:15 PM CDT   TREATMENT with RADIATION THERAPIST   [facility] ([facility]    [address] [facility] [address]   [phone]            Mar 31, 2017  3:15 PM CDT   TREATMENT with RADIATION THERAPIST   [facility] ([facility])    [address] [facility] [address]   [phone]            Apr 03, 2017  3:15 PM CDT   TREATMENT with RADIATION THERAPIST   [facility] ([facility])    [address] [facility] [address]   [phone]            Apr 04, 2017  3:15 PM CDT   TREATMENT with RADIATION THERAPIST   [facility] ([facility])    [address] [facility] [address]   [phone]            Apr 05, 2017  3:15 PM CDT   TREATMENT with RADIATION THERAPIST   [facility] ([facility])    [address] [facility] [address]   [phone]            Apr 06, 2017  3:15 PM CDT   TREATMENT with RADIATION THERAPIST   [facility] ([facility])    [address] [facility] [address]   [phone]            Apr 06, 2017  3:30 PM CDT   on treatment visit with Joce Lovelace MD   [facility] ([facility])    [address] [facility] [address]   [phone]            Apr 07, 2017  8:15 AM CDT   T[facility]AT[facility] with RAD[facility] THERAPIST   [facility]    [address] [facility] [address]   [phone]            Apr 10, 2017  3:15 PM CDT   TREATMENT with RAD[facility]ATION THERAPIST   [facility]    [address] [facility] [address]   [phone]            Apr 11, 2017  3:15 PM CDT   TREATMENT with RADIATION THERAPIST   [facility]    [address] [place] [address]   [phone]              Who to contact     If you have questions or need follow up information about today's clinic visit or your schedule please contact [facility] directly at [phone].  Normal or non-critical lab and imaging results will be communicated to you by theDrophart, letter or phone within 4 business days after the clinic has received the results. If you do not hear from us within 7 days, please contact the clinic through Think-Nowt or phone. If you have a critical or abnormal lab result, we will notify you by phone as soon as possible.  Submit refill requests through [email or web address] or call your pharmacy and they will forward the refill request to us. Please allow 3 business days for your refill to be completed.          Additional Information About Your Visit        [email or web address] Information     [email or web address] is an electronic gateway that provides easy, online access to your medical records. With [email or web address], you can request a clinic appointment, read your test results, renew a prescription or communicate with your care team.     To sign up for [email or web address] visit the website at [email or web address]   You will be asked to enter the access code listed below, as well as some personal information. Please follow the directions to create your username and  password.     Your access code is: [email or web address]  Expires: 2017 11:06 AM     Your access code will  in 90 days. If you need help or a new code, please contact your [facility] or call [phone] for assistance.        Care EveryWhere ID     This is your Care EveryWhere ID. This could be used by other organizations to access your [facility] medical records  [id number]        Your Vitals Were     BMI (Body Mass Index)                   38.11 kg/m2            Blood Pressure from Last 3 Encounters:   17 (!) 142/96   17 (!) 140/96   17 127/86    Weight from Last 3 Encounters:   17 229 lb   17 228 lb   17 224 lb              Today, you had the following     No orders found for display       Primary Care Provider Office Phone # Fax #    Mal Ladd [phone]       [address] [address]        Thank you!     Thank you for choosing [facility]  for your care. Our goal is always to provide you with excellent care. Hearing back from our patients is one way we can continue to improve our services. Please take a few minutes to complete the written survey that you may receive in the mail after your visit with us. Thank you!             Your Updated Medication List - Protect others around you: Learn how to safely use, store and throw away your medicines at www.disposemymeds.org.          This list is accurate as of: 3/29/17  1:38 PM.  Always use your most recent med list.                   Brand Name Dispense Instructions for use    capecitabine 500 MG tablet CHEMO    XELODA    196 tablet    Take 4 tablets (2000mg) in the AM and 3 tablets (1500mg) in the PM. Take for 5 days per week Monday-Friday. Do NOT take on Saturday-. Take with water within 30 minutes after meal.       LORazepam 0.5 MG tablet    ATIVAN    30 tablet    Take 1 tablet (0.5 mg) by mouth every 4 hours as needed (Anxiety,  Nausea/Vomiting or Sleep)       prochlorperazine 10 MG tablet    COMPAZINE    30 tablet    Take 1 tablet (10 mg) by mouth every 6 hours as needed (Nausea/Vomiting)

## 2017-03-29 NOTE — PROGRESS NOTES
SPIRITUAL HEALTH SERVICES  SPIRITUAL ASSESSMENT Progress Note  [facility]    PRIMARY FOCUS:     Support for coping - One of the  stated pt was her neighbor and encouraged  to see her.    ILLNESS CIRCUMSTANCES:   Reviewed documentation.      Context of Serious Illness/Symptom(s) - Cancer    Resources for Support - Family, [employer] in [place]    DISTRESS:     Emotional/Spiritual/Existential Distress - The stress which accompanies living with cancer.    Baptism Distress - Not Applicable    Social/Cultural/Economic Distress - Not Discussed    SPIRITUAL/Zoroastrianism COPING:     Nondenominational/Liliana - Anglican; active member of [employer].    Spiritual Practice(s) - Yazidism and prayer.  Pt reported she was getting great support from her Jain.  When  gave pt a prayer shawl, she became teary-eyed.    Emotional/Relational/Existential Connections - Not Discussed    GOALS OF CARE:    Goals of Care - treat cancer.    Meaning/Sense-Making - Not Discussed    PLAN:  is available for pt/family needs.    Ryan Espinal M.Div., [facility]  Staff   Office tel: [phone]

## 2017-03-29 NOTE — PATIENT INSTRUCTIONS
Please contact [facility] RN with questions or concerns following today's appointment: [phone].    Thank you!

## 2017-03-29 NOTE — PROGRESS NOTES
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 Lion  Drug Name/Frequency 1: Xeloda    Subjective:   Ms. Stover continues to tolerate her radiation treatments well. Continues to have some constipation and has been using miralax. No pressing issues or concerns today.     Nursing ROS:   Nutrition Alteration  Diet Type: Patient's Preference  Skin  Skin Reaction: 0 - No changes        Cardiovascular  Respiratory effort: 1 - Normal - without distress  Gastrointestinal  Nausea: 1 - One to two episodes of nausea/24  Vomitin - No vomiting (ons)  GI Note: on-going constipation, taking Miralax daily with relief, patient taking Compazine and Ativan po as needed  Genitourinary  Urinary Status: 0 - Normal  Psychosocial  Mood - Anxiety: 0 - Normal  Mood - Depression: 0 - Normal  Pyschosocial Note: energy level at baseline  Pain Assessment  0-10 Pain Scale: 0      Objective:   Wt 229 lb  BMI 38.11 kg/m2   Appears well, alert, oriented, and in NAD    Labs:  CBC RESULTS:   Recent Labs   Lab Test  17   1502   WBC  6.4   RBC  4.79   HGB  13.9   HCT  40.5   MCV  85   MCH  29.0   MCHC  34.3   RDW  13.0   PLT  248     ELECTROLYTES:  Recent Labs   Lab Test  17   1502   NA  138   POTASSIUM  3.9   CHLORIDE  105   MG  8.7   CO2  27   BUN  12   CR  0.78   GLC  92       Assessment:    Tolerating radiation therapy well.  All questions and concerns addressed.    Plan:   1. Continue current therapy.        Mosaiq chart and setup information reviewed  Ports  checked    Medication Review  Med list reviewed with patient?: Yes  Med list printed and given: Offered and declined    Educational Topic Discussed  Additional Instructions: reviewed use of vaginal dilator, will start using this week  Education Instructions: follow-up with Dr. Lion on 4/12/2017      I saw the patient with the resident.  I agree with the resident note and plan of care.      Radha Segovia MD   [phone]

## 2017-03-30 ENCOUNTER — APPOINTMENT (OUTPATIENT)
Dept: RADIATION ONCOLOGY | Facility: CLINIC | Age: 52
End: 2017-03-30
Payer: COMMERCIAL

## 2017-03-30 PROCEDURE — 77412 RADIATION TX DELIVERY LVL 3: CPT | Performed by: RADIOLOGY

## 2017-03-30 PROCEDURE — 77014 ZZHC CT GUIDE FOR PLACEMENT RADIATION THERAPY FIELDS: CPT | Performed by: RADIOLOGY

## 2017-03-31 ENCOUNTER — APPOINTMENT (OUTPATIENT)
Dept: RADIATION ONCOLOGY | Facility: CLINIC | Age: 52
End: 2017-03-31
Payer: COMMERCIAL

## 2017-03-31 PROCEDURE — 77427 RADIATION TX MANAGEMENT X5: CPT | Performed by: RADIOLOGY

## 2017-03-31 PROCEDURE — 77336 RADIATION PHYSICS CONSULT: CPT | Performed by: RADIOLOGY

## 2017-03-31 PROCEDURE — 77412 RADIATION TX DELIVERY LVL 3: CPT | Performed by: RADIOLOGY

## 2017-04-03 ENCOUNTER — APPOINTMENT (OUTPATIENT)
Dept: RADIATION ONCOLOGY | Facility: CLINIC | Age: 52
End: 2017-04-03
Payer: COMMERCIAL

## 2017-04-03 PROCEDURE — 77412 RADIATION TX DELIVERY LVL 3: CPT | Performed by: RADIOLOGY

## 2017-04-04 ENCOUNTER — APPOINTMENT (OUTPATIENT)
Dept: RADIATION ONCOLOGY | Facility: CLINIC | Age: 52
End: 2017-04-04
Payer: COMMERCIAL

## 2017-04-04 PROCEDURE — 77014 ZZHC CT GUIDE FOR PLACEMENT RADIATION THERAPY FIELDS: CPT | Performed by: RADIOLOGY

## 2017-04-04 PROCEDURE — 77412 RADIATION TX DELIVERY LVL 3: CPT | Performed by: RADIOLOGY

## 2017-04-05 ENCOUNTER — APPOINTMENT (OUTPATIENT)
Dept: RADIATION ONCOLOGY | Facility: CLINIC | Age: 52
End: 2017-04-05
Payer: COMMERCIAL

## 2017-04-05 PROCEDURE — 77412 RADIATION TX DELIVERY LVL 3: CPT | Performed by: RADIOLOGY

## 2017-04-06 ENCOUNTER — APPOINTMENT (OUTPATIENT)
Dept: RADIATION ONCOLOGY | Facility: CLINIC | Age: 52
End: 2017-04-06
Payer: COMMERCIAL

## 2017-04-06 ENCOUNTER — OFFICE VISIT (OUTPATIENT)
Dept: RADIATION ONCOLOGY | Facility: CLINIC | Age: 52
End: 2017-04-06
Payer: COMMERCIAL

## 2017-04-06 VITALS — BODY MASS INDEX: 37.61 KG/M2 | WEIGHT: 226 LBS

## 2017-04-06 DIAGNOSIS — C20 MALIGNANT NEOPLASM OF RECTUM (H): Primary | ICD-10-CM

## 2017-04-06 PROCEDURE — 99207 ZZC NO BILLABLE SERVICE THIS VISIT: CPT | Performed by: RADIOLOGY

## 2017-04-06 PROCEDURE — 77014 ZZHC CT GUIDE FOR PLACEMENT RADIATION THERAPY FIELDS: CPT | Performed by: RADIOLOGY

## 2017-04-06 PROCEDURE — 77412 RADIATION TX DELIVERY LVL 3: CPT | Performed by: RADIOLOGY

## 2017-04-06 ASSESSMENT — PAIN SCALES - GENERAL: PAINLEVEL: NO PAIN (0)

## 2017-04-06 NOTE — PATIENT INSTRUCTIONS
Please contact [facility] RN with questions or concerns following today's appointment: [phone].    Thank you!

## 2017-04-06 NOTE — MR AVS SNAPSHOT
After Visit Summary   4/6/2017    Matthew Stover    MRN: [id number]           Patient Information     Date Of Birth          1965        Visit Information        Provider Department      4/6/2017 3:30 PM Joce Lovelace MD [facility]        Today's Diagnoses     Malignant neoplasm of rectum (H)    -  1      Care Instructions    [facility] RN with questions or concerns following today's appointment: [phone].    Thank you!          Follow-ups after your visit        Your next 10 appointments already scheduled     Apr 10, 2017  3:15 PM CDT   TREATMENT with RADIATION THERAPIST   [facility] ([facility])    [address] [facility] [address] [phone]            Apr 11, 2017  3:15 PM CDT   TREATMENT with RADIATION THERAPIST   [facility] ([facility])    [address] [facility] [address] [phone]            Apr 12, 2017  3:15 PM CDT   TREATMENT with RADIATION THERAPIST   [facility] ([facility])    [address] [facility] [address] [phone]            Apr 12, 2017  3:30 PM CDT   LAB with LAB ONC [facility] ([facility])    [address] [facility] [address] [phone]           Patient must bring picture ID.  Patient should be prepared to give a urine specimen  Please do not eat 10-12 hours before your appointment if you are coming in fasting for labs on lipids, cholesterol, or glucose (sugar).  Pregnant women should follow their Care Team instructions. Water with medications is okay. Do not drink coffee or other fluids.   If you have concerns about taking  your medications, please ask at office or if scheduling via Carweez, send a message by clicking on Secure Messaging, Message Your Care Team.            Apr 12, 2017   4:15 PM CDT   Return Visit with Wisam Lion MD   [facility]    [address] [facility] [address]   [phone]            Apr 13, 2017  3:15 PM CDT   TREATMENT with RADIATION THERAPIST   [facility] ([facility])    [address] [facility] [address]   [phone]            Apr 13, 2017  3:30 PM CDT   on treatment visit with Joce Lovelace MD   [facility] ([facility])    [address] [facility] [address]   [phone]            Apr 14, 2017  3:15 PM CDT   TREATMENT with RADIATION THERAPIST   [facility] ([facility])    [address] [facility] [address]   [phone]            Apr 17, 2017  3:15 PM CDT   TREATMENT with RADIATION THERAPIST   [facility] ([facility])    [address] [facility] [address]   [phone]            Apr 18, 2017  3:15 PM CDT   TREATMENT with RADIATION THERAPIST   [facility] ([facility])    [address] [facility] [address]   [phone]              Who to contact     If you have questions or need follow up information about today's clinic visit or your schedule please contact [facility] directly at [phone].  Normal or non-critical lab and imaging results will be communicated to you by MyChart, letter or phone within 4 business days after the clinic has received the results. If you do not hear from us within 7 days, please contact the clinic through MyChart or phone. If you have a critical or abnormal lab result, we will notify you by phone as soon as possible.  Submit refill requests through Humedics or call your pharmacy and they will forward the refill request to us. Please allow 3 business days for your refill to be completed.          Additional  Information About Your Visit        Franchisee Gladiatorhart Information     StyleSeat gives you secure access to your electronic health record. If you see a primary care provider, you can also send messages to your care team and make appointments. If you have questions, please call your primary care clinic.  If you do not have a primary care provider, please call [phone] and they will assist you.      StyleSeat is an electronic gateway that provides easy, online access to your medical records. With StyleSeat, you can request a clinic appointment, read your test results, renew a prescription or communicate with your care team.     To access your existing account, please contact your [facility] or call [phone] for assistance.        Care EveryWhere ID     This is your Care EveryWhere ID. This could be used by other organizations to access your [facility] medical records  [id number]        Your Vitals Were     BMI (Body Mass Index)                   37.61 kg/m2            Blood Pressure from Last 3 Encounters:   03/27/17 (!) 142/96   03/17/17 (!) 140/96   03/08/17 127/86    Weight from Last 3 Encounters:   04/06/17 226 lb   03/29/17 229 lb   03/27/17 228 lb              Today, you had the following     No orders found for display       Primary Care Provider Office Phone # Fax #    Mal Ladd [phone]       [address] [address]        Thank you!     Thank you for choosing [facility]  for your care. Our goal is always to provide you with excellent care. Hearing back from our patients is one way we can continue to improve our services. Please take a few minutes to complete the written survey that you may receive in the mail after your visit with us. Thank you!             Your Updated Medication List - Protect others around you: Learn how to safely use, store and throw away your medicines at www.disposemymeds.org.          This list is  accurate as of: 4/6/17 11:59 PM.  Always use your most recent med list.                   Brand Name Dispense Instructions for use    capecitabine 500 MG tablet CHEMO    XELODA    196 tablet    Take 4 tablets (2000mg) in the AM and 3 tablets (1500mg) in the PM. Take for 5 days per week Monday-Friday. Do NOT take on Saturday-Sunday. Take with water within 30 minutes after meal.       LORazepam 0.5 MG tablet    ATIVAN    30 tablet    Take 1 tablet (0.5 mg) by mouth every 4 hours as needed (Anxiety, Nausea/Vomiting or Sleep)       prochlorperazine 10 MG tablet    COMPAZINE    30 tablet    Take 1 tablet (10 mg) by mouth every 6 hours as needed (Nausea/Vomiting)

## 2017-04-07 ENCOUNTER — APPOINTMENT (OUTPATIENT)
Dept: RADIATION ONCOLOGY | Facility: CLINIC | Age: 52
End: 2017-04-07
Payer: COMMERCIAL

## 2017-04-07 PROCEDURE — 77336 RADIATION PHYSICS CONSULT: CPT | Performed by: RADIOLOGY

## 2017-04-07 PROCEDURE — 77427 RADIATION TX MANAGEMENT X5: CPT | Performed by: RADIOLOGY

## 2017-04-07 PROCEDURE — 77412 RADIATION TX DELIVERY LVL 3: CPT | Performed by: RADIOLOGY

## 2017-04-07 NOTE — PROGRESS NOTES
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 Lion  Drug Name/Frequency 1: Xeloda    Subjective: Overall doing well. Slight increase in urgency with bowel movements otherwise no concerns. Nausea controlled with meds. No urinary sxs.    Nursing ROS:   Nutrition Alteration  Diet Type: Patient's Preference  Skin  Skin Reaction: 0 - No changes  Skin Intervention: using Vanicream        Cardiovascular  Respiratory effort: 1 - Normal - without distress  Gastrointestinal  Nausea: 1 - One to two episodes of nausea/24  Vomitin - No vomiting (ons)  GI Note: patient reports slight urgency with bowel movements, small liquid bowel movements, taking Compazine and Ativan  Genitourinary  Urinary Status: 0 - Normal  Psychosocial  Mood - Anxiety: 0 - Normal  Mood - Depression: 0 - Normal  Pyschosocial Note: energy level at baseline  Pain Assessment  0-10 Pain Scale: 0    Objective:   Wt 226 lb  BMI 37.61 kg/m2  Gen: Appears well, NAD  Skin: No erythema    Labs:  CBC RESULTS:   Recent Labs   Lab Test  17   1502   WBC  6.4   RBC  4.79   HGB  13.9   HCT  40.5   MCV  85   MCH  29.0   MCHC  34.3   RDW  13.0   PLT  248     ELECTROLYTES:  Recent Labs   Lab Test  17   1502   NA  138   POTASSIUM  3.9   CHLORIDE  105   MG  8.7   CO2  27   BUN  12   CR  0.78   GLC  92       Assessment:    Tolerating radiation therapy well.  All questions and concerns addressed.    Plan:   1. Continue current therapy.        [facility] chart and setup information reviewed  IGRT images  reviewed    Medication Review  Med list reviewed with patient?: Yes  Med list printed and given: Offered and declined    Educational Topic Discussed  Additional Instructions: patient has started using vaginal dilator, denies concerns  Education Instructions: follow-up with Dr. Lion on 4/12/2017      Joce Lovelace M.D.  [facility]  Radiation Oncology    [phone] [facility]  [phone] pager

## 2017-04-10 ENCOUNTER — APPOINTMENT (OUTPATIENT)
Dept: RADIATION ONCOLOGY | Facility: CLINIC | Age: 52
End: 2017-04-10
Payer: COMMERCIAL

## 2017-04-10 ENCOUNTER — DOCUMENTATION ONLY (OUTPATIENT)
Dept: LAB | Facility: CLINIC | Age: 52
End: 2017-04-10

## 2017-04-10 DIAGNOSIS — C20 MALIGNANT NEOPLASM OF RECTUM (H): Primary | ICD-10-CM

## 2017-04-10 PROCEDURE — 77412 RADIATION TX DELIVERY LVL 3: CPT | Performed by: RADIOLOGY

## 2017-04-11 ENCOUNTER — APPOINTMENT (OUTPATIENT)
Dept: RADIATION ONCOLOGY | Facility: CLINIC | Age: 52
End: 2017-04-11
Payer: COMMERCIAL

## 2017-04-11 PROCEDURE — 77014 ZZHC CT GUIDE FOR PLACEMENT RADIATION THERAPY FIELDS: CPT | Performed by: RADIOLOGY

## 2017-04-11 PROCEDURE — 77412 RADIATION TX DELIVERY LVL 3: CPT | Performed by: RADIOLOGY

## 2017-04-12 ENCOUNTER — ONCOLOGY VISIT (OUTPATIENT)
Dept: ONCOLOGY | Facility: CLINIC | Age: 52
End: 2017-04-12
Payer: COMMERCIAL

## 2017-04-12 ENCOUNTER — APPOINTMENT (OUTPATIENT)
Dept: RADIATION ONCOLOGY | Facility: CLINIC | Age: 52
End: 2017-04-12
Payer: COMMERCIAL

## 2017-04-12 VITALS
DIASTOLIC BLOOD PRESSURE: 84 MMHG | BODY MASS INDEX: 37.59 KG/M2 | HEART RATE: 72 BPM | SYSTOLIC BLOOD PRESSURE: 134 MMHG | TEMPERATURE: 97.9 F | WEIGHT: 225.6 LBS | HEIGHT: 65 IN | RESPIRATION RATE: 16 BRPM

## 2017-04-12 DIAGNOSIS — C20 MALIGNANT NEOPLASM OF RECTUM (H): ICD-10-CM

## 2017-04-12 DIAGNOSIS — C20 MALIGNANT NEOPLASM OF RECTUM (H): Primary | ICD-10-CM

## 2017-04-12 LAB
ALBUMIN SERPL-MCNC: 3.4 G/DL (ref 3.4–5)
ALP SERPL-CCNC: 80 U/L (ref 40–150)
ALT SERPL W P-5'-P-CCNC: 20 U/L (ref 0–50)
ANION GAP SERPL CALCULATED.3IONS-SCNC: 5 MMOL/L (ref 3–14)
AST SERPL W P-5'-P-CCNC: 18 U/L (ref 0–45)
BASOPHILS # BLD AUTO: 0 10E9/L (ref 0–0.2)
BASOPHILS NFR BLD AUTO: 0.5 %
BILIRUB SERPL-MCNC: 0.5 MG/DL (ref 0.2–1.3)
BUN SERPL-MCNC: 11 MG/DL (ref 7–30)
CALCIUM SERPL-MCNC: 8.5 MG/DL (ref 8.5–10.1)
CHLORIDE SERPL-SCNC: 104 MMOL/L (ref 94–109)
CO2 SERPL-SCNC: 27 MMOL/L (ref 20–32)
CREAT SERPL-MCNC: 0.93 MG/DL (ref 0.52–1.04)
DIFFERENTIAL METHOD BLD: ABNORMAL
EOSINOPHIL # BLD AUTO: 0.1 10E9/L (ref 0–0.7)
EOSINOPHIL NFR BLD AUTO: 3.5 %
ERYTHROCYTE [DISTWIDTH] IN BLOOD BY AUTOMATED COUNT: 15 % (ref 10–15)
GFR SERPL CREATININE-BSD FRML MDRD: 64 ML/MIN/1.7M2
GLUCOSE SERPL-MCNC: 106 MG/DL (ref 70–99)
HCT VFR BLD AUTO: 41.2 % (ref 35–47)
HGB BLD-MCNC: 13.9 G/DL (ref 11.7–15.7)
LYMPHOCYTES # BLD AUTO: 0.5 10E9/L (ref 0.8–5.3)
LYMPHOCYTES NFR BLD AUTO: 13.8 %
MCH RBC QN AUTO: 29.4 PG (ref 26.5–33)
MCHC RBC AUTO-ENTMCNC: 33.7 G/DL (ref 31.5–36.5)
MCV RBC AUTO: 87 FL (ref 78–100)
MONOCYTES # BLD AUTO: 0.3 10E9/L (ref 0–1.3)
MONOCYTES NFR BLD AUTO: 7.3 %
NEUTROPHILS # BLD AUTO: 2.8 10E9/L (ref 1.6–8.3)
NEUTROPHILS NFR BLD AUTO: 74.9 %
PLATELET # BLD AUTO: 185 10E9/L (ref 150–450)
POTASSIUM SERPL-SCNC: 4 MMOL/L (ref 3.4–5.3)
PROT SERPL-MCNC: 7.5 G/DL (ref 6.8–8.8)
RBC # BLD AUTO: 4.73 10E12/L (ref 3.8–5.2)
SODIUM SERPL-SCNC: 136 MMOL/L (ref 133–144)
WBC # BLD AUTO: 3.7 10E9/L (ref 4–11)

## 2017-04-12 PROCEDURE — 80053 COMPREHEN METABOLIC PANEL: CPT | Performed by: NURSE PRACTITIONER

## 2017-04-12 PROCEDURE — 77412 RADIATION TX DELIVERY LVL 3: CPT | Performed by: RADIOLOGY

## 2017-04-12 PROCEDURE — 85025 COMPLETE CBC W/AUTO DIFF WBC: CPT | Performed by: NURSE PRACTITIONER

## 2017-04-12 PROCEDURE — 36415 COLL VENOUS BLD VENIPUNCTURE: CPT | Performed by: NURSE PRACTITIONER

## 2017-04-12 PROCEDURE — 99215 OFFICE O/P EST HI 40 MIN: CPT | Performed by: INTERNAL MEDICINE

## 2017-04-12 ASSESSMENT — PAIN SCALES - GENERAL: PAINLEVEL: MILD PAIN (2)

## 2017-04-12 NOTE — NURSING NOTE
"Matthew Stover is a 51 year old female who presents for:  Chief Complaint   Patient presents with     Oncology Clinic Visit        Initial Vitals:  /84 (BP Location: Right arm, Patient Position: Chair, Cuff Size: Adult Regular)  Pulse 72  Temp 97.9  F (36.6  C) (Oral)  Resp 16  Ht 1.651 m (5' 5\")  Wt 102.3 kg (225 lb 9.6 oz)  BMI 37.54 kg/m2 Estimated body mass index is 37.54 kg/(m^2) as calculated from the following:    Height as of this encounter: 1.651 m (5' 5\").    Weight as of this encounter: 102.3 kg (225 lb 9.6 oz).. Body surface area is 2.17 meters squared. BP completed using cuff size: regular  Mild Pain (2) No LMP recorded. Patient has had an ablation. Allergies and medications reviewed.     Medications: MEDICATION REFILL NOT NEEDED TODAY.  Pharmacy name entered into [facility]: [facility] [id number] [facility] [address] [address]    Comments: Patient has two weeks remaining for oral chemotherapy and radiation.  She continues to feel a little nausea and is using Compazine and Lorazepam with fair results.      10  minutes for nursing intake (face to face time)   Ruba Jordan RN      "

## 2017-04-12 NOTE — PROGRESS NOTES
2017    CHIEF COMPLAINT:  Follow up for Stage IIIB or IIIC moderately differentiated adenocarcinoma of the rectum.  MSI is intact.  It is a cT3 N2 disease.     Colorectal Surgeon:  Dr. Lopez at [facility]  Primary Radiation Oncologist: Dr. Lovelace     HISTORY OF PRESENT ILLNESS:    Please see my prior note for details. She was recently diagnosed with locally advanced rectal cancer.  She started neoadjuvant treatment with with Xeloda 2000mg in AM and 1500mg in PM along with radiotherapy on 3/20/17    INTERVAL HISTORY  She is doing well.  She denies any significant pain.  She is tolerating Xeloda and radiation well.  She does have some nausea for which she takes Compazine in the morning, and then again at night, and she also takes Ativan at night to help with nausea, as well as to sleep.  Despite that, on occasions during the daytime she feels somewhat nauseous, but she has not vomited, and she is able to maintain her nutrition.  She denies any mouth sores.  She denies any interval infections.  She is applying lotion on her hands, as well as her feet, and she has had no hand, foot or skin problems related to Xeloda.  She thinks her energy is stable.  She denies any bleeding or shortness of breath.  She continues to have loose bowel movements.       ROS:  A comprehensive ROS was otherwise neg      I reviewed other hx as below    PAST MEDICAL HISTORY:  She had a hernia repair at 3 months,  in , left knee meniscus surgery in , appendix surgery in , and fibroadenoma removed from the right breast in 2016.      FAMILY HISTORY:  Mother had uterine cancer.  The patient has 2 kids who are healthy.      SOCIAL HISTORY:  She does not smoke.  Drinks alcohol occasionally.  Works in the [employer].  She lives with her  and 2 kids.      ALLERGIES:  None.      MEDICATIONS:  Reviewed    PHYSICAL EXAM  /84 (BP Location: Right arm, Patient Position: Chair, Cuff Size: Adult  "Regular)  Pulse 72  Temp 97.9  F (36.6  C) (Oral)  Resp 16  Ht 1.651 m (5' 5\")  Wt 102.3 kg (225 lb 9.6 oz)  BMI 37.54 kg/m2  CONSTITUTIONAL: middle aged female in no acute distress  EYES: PERRLA, no palor no icterus.   ENT/MOUTH: no mouth lesions. Ears normal  CVS: s1s2 no m r g .   RESPIRATORY: chest clear   GI: soft non tender no organomegaly  NEURO: AAOX3  Grossly non focal neuro exam  INTEGUMENT: no obvious skin rashes  LYMPHATIC: no palpable LNs  MUSCULOSKELETAL: Unremarkable. No bony tenderness.   EXTREMITIES: no edema  PSYCH: Mentation, mood and affect are normal. Decision making capacity is intact    LABORATORY DATA:    Reviewed and stable   CEA was 5.2.         ASSESSMENT AND PLAN:     1.  She has stage IIIB or IIIC moderately differentiated adenocarcinoma of the rectum.  MSI is intact.  It is a clinical T3 N2 disease.  I am not sure if it is an N2b or N2c disease at this time.    She is receiving carmela-adjuvant concurrent chemotherapy and radiation with oral Xeloda. She started on 3/20/17. Completion date is 4/26/17  She is tolerating it well. We will cont with treatment as scheduled  After completing carmela-adjuvant therapy, we will take a break or about 6 weeks and then re-image with CT CAP and Pelvis MRI. Hopefully after that she can proceed with surgery with adjuvant chemotherapy thereafter    2.  She is maintaining good nutrition and maintaining weight which is great.    3. For the nausea, we discussed that she should take Compazine in the morning, and then again during the afternoon, as well as at night.  I told her that she can take Compazine every 6 hours as needed.  It is reasonable to continue taking Ativan at night.  If despite this regimen of Compazine she still feels nauseous, then we can add Zofran during the day.  She understands that.     4. I also encouraged her to exercise regularly and be active for maintenance of general well being    5.  She is going to see a genetic counselor on " 5/10/17      I will see her back in 1 week    All of her and her 's questions were answered to their satisfaction, and they were agreeable and comfortable with this plan.     Wisam Lion

## 2017-04-13 ENCOUNTER — OFFICE VISIT (OUTPATIENT)
Dept: RADIATION ONCOLOGY | Facility: CLINIC | Age: 52
End: 2017-04-13
Payer: COMMERCIAL

## 2017-04-13 ENCOUNTER — DOCUMENTATION ONLY (OUTPATIENT)
Dept: PHARMACY | Facility: CLINIC | Age: 52
End: 2017-04-13

## 2017-04-13 ENCOUNTER — APPOINTMENT (OUTPATIENT)
Dept: RADIATION ONCOLOGY | Facility: CLINIC | Age: 52
End: 2017-04-13
Payer: COMMERCIAL

## 2017-04-13 VITALS — WEIGHT: 225 LBS | BODY MASS INDEX: 37.44 KG/M2

## 2017-04-13 DIAGNOSIS — C20 MALIGNANT NEOPLASM OF RECTUM (H): Primary | ICD-10-CM

## 2017-04-13 DIAGNOSIS — C20 MALIGNANT NEOPLASM OF RECTUM (H): ICD-10-CM

## 2017-04-13 PROCEDURE — 77014 ZZHC CT GUIDE FOR PLACEMENT RADIATION THERAPY FIELDS: CPT | Performed by: RADIOLOGY

## 2017-04-13 PROCEDURE — 99207 ZZC NO BILLABLE SERVICE THIS VISIT: CPT | Performed by: RADIOLOGY

## 2017-04-13 PROCEDURE — 77412 RADIATION TX DELIVERY LVL 3: CPT | Performed by: RADIOLOGY

## 2017-04-13 RX ORDER — CAPECITABINE 500 MG/1
TABLET, FILM COATED ORAL
Qty: 196 TABLET | Refills: 0 | Status: SHIPPED | OUTPATIENT
Start: 2017-04-13

## 2017-04-13 ASSESSMENT — PAIN SCALES - GENERAL: PAINLEVEL: NO PAIN (0)

## 2017-04-13 NOTE — PATIENT INSTRUCTIONS
Please contact [facility] RN with questions or concerns following today's appointment: [phone].    Thank you!

## 2017-04-13 NOTE — PROGRESS NOTES
Oral Chemotherapy Monitoring Program    Primary Oncologist: Dr. Wisam Lion  Primary Oncology Clinic: [facility]  Cancer Diagnosis: Rectal Cancer    Drug: capecitabine (Xeloda) 2000 mg in AM and 1500 mg in PM M-F during XRT  Therapy History:  Start Date: 3/20/17  Expected duration of therapy: For 28 days during XRT M-F. Total of 56 doses.    Drug Interaction Assessment: No significant drug interactions identified upon review.    Lab Monitoring Plan  Day 22: CMP, CBC  Subjective/Objective:  Matthew Stover is a 51 year old female seen in clinic for a follow-up visit for oral chemotherapy.  Matthew reports she is doing ok on the Xeloda. She has some nausea, which is controlled with scheduled prochlorperazine every 6 hours. She denies any other side effects.     ORAL CHEMOTHERAPY 3/8/2017 3/23/2017 3/27/2017 4/13/2017   Drug Name Xeloda (Capecitabine) Xeloda (Capecitabine) Xeloda (Capecitabine) Xeloda (Capecitabine)   Current Dosage Other Other Other Other   Current Schedule BID BID BID BID   Cycle Details M-F only during XRT M-F only during XRT M-F only during XRT M-F only during XRT   Start Date of Last Cycle - 3/20/2017 3/20/2017 3/20/2017   Planned next cycle start date 3/20/2017 - - -   Doses missed in last 2 weeks - 0 0 0   Adherence Assessment - Adherent Adherent Adherent   Adverse Effects - Nausea Nausea;Palmar-plantar erythrodysethesia syndrome Nausea   Nausea - - Resolved due to intervention Grade 1   Pharmacist Intervention(nausea) - Yes - No   Intervention(s) - Patient education - -   Palmar-plantar Erythrodysethesia syndrome[hand-foot syndrome] - - Grade 1 -   Pharmacist Intervention(Palmar-plantar) - - No -   Other (see note for details) - headache, sleep issues, anxiety - -   Pharmacist intervention? - Yes - -   Intervention(s) - Rx medication recommendation - -   Any new drug interactions? No No No No   Is the dose as ordered appropriate for the patient? Yes - - -   Is the  "patient currently in pain? Assessed in last 30 days. - Assessed in last 30 days. Assessed in last 30 days.   Has the patient been assessed within the past 6 months for depression? Yes - Yes Yes   Has the patient missed any days of school, work, or other routine activity? - No - -       Vitals:  BP:   BP Readings from Last 1 Encounters:   04/12/17 134/84     Wt Readings from Last 1 Encounters:   04/12/17 102.3 kg (225 lb 9.6 oz)     Estimated body surface area is 2.17 meters squared as calculated from the following:    Height as of 4/12/17: 1.651 m (5' 5\").    Weight as of 4/12/17: 102.3 kg (225 lb 9.6 oz).    Labs:  Lab Results   Component Value Date     04/12/2017      Lab Results   Component Value Date    POTASSIUM 4.0 04/12/2017     Lab Results   Component Value Date    MG 8.5 04/12/2017     Lab Results   Component Value Date    ALBUMIN 3.4 04/12/2017       Lab Results   Component Value Date    BUN 11 04/12/2017     Lab Results   Component Value Date    CR 0.93 04/12/2017       Lab Results   Component Value Date    AST 18 04/12/2017     Lab Results   Component Value Date    ALT 20 04/12/2017     Lab Results   Component Value Date    BILITOTAL 0.5 04/12/2017       Lab Results   Component Value Date    WBC 3.7 04/12/2017     Lab Results   Component Value Date    HGB 13.9 04/12/2017     Lab Results   Component Value Date     04/12/2017     Lab Results   Component Value Date    ANEU 2.8 04/12/2017       Assessment:  Matthew is tolerating therapy with some nausea. Her Day 22 labs are ok.     Plan:  Continue current therapy.     Follow-Up:  Appt with Dr. Lion on 4/19 at 8:45 am    Refill Due:  No refills needed. Full script at [facility]    Mari Stokes, Pharm. D.      "

## 2017-04-13 NOTE — TELEPHONE ENCOUNTER
Xeloda 500mg tabs      Last Written Prescription Date: 03-08-17  Last Fill Quantity: 196,  # refills: 0   Last Office Visit with [facility], [facility] or [facility] prescribing provider: 04-12-17                                         Next 5 appointments (look out 90 days)     Apr 19, 2017  8:45 AM CDT   Return Visit with Wisam Lion MD   [facility] ([facility]    [address] [place] [address] [id number] [phone]                    Thanks!    Alessandro Hdez [facility]- Float Technician  For [place] [facility]

## 2017-04-13 NOTE — MR AVS SNAPSHOT
After Visit Summary   4/13/2017    Matthew Stover    MRN: [id number]           Patient Information     Date Of Birth          1965        Visit Information        Provider Department      4/13/2017 3:30 PM Joce Lovelace MD [facility]        Today's Diagnoses     Malignant neoplasm of rectum (H)    -  1      Care Instructions    Please contact [facility] RN with questions or concerns following today's appointment: [phone].    Thank you!          Follow-ups after your visit        Your next 10 appointments already scheduled     Apr 14, 2017  3:15 PM CDT   TREATMENT with RADIATION THERAPIST   [facility]    [address] [facility] [address]   [phone]            Apr 17, 2017  3:15 PM CDT   TREATMENT with RADIATION THERAPIST   [facility] ([facility])    [address] [facility] [address]   [phone]            Apr 18, 2017  3:15 PM CDT   TREATMENT with RADIATION THERAPIST   [facility] ([facility])    [address] [facility] [address]   [phone]            Apr 19, 2017  8:45 AM CDT   Return Visit with Wisam Lion MD   [facility] ([facility]    [address] [facility] [address]   [phone]            Apr 19, 2017  3:15 PM CDT   TREATMENT with RADIATION THERAPIST   [facility] ([facility])    [address] [facility] [address]   [phone]            Apr 20, 2017  3:15 PM CDT   TREATMENT with RADIATION THERAPIST   [facility] ([facility])    [address] [facility] [address]   [phone]            Apr 20, 2017  3:30 PM CDT   on treatment visit with Joce Lovelace MD   [facility]    [address] [facility] [address]   [phone]            Apr 21, 2017  3:15 PM CDT   TREATMENT with RADIATION THERAPIST   [facility]    [address] [facility] [address]   [phone]            Apr 24, 2017  3:15 PM CDT   TREATMENT with RADIATION THERAPIST   [facility] ([facility])    [address] [facility] [address] [phone]            Apr 25, 2017  3:15 PM CDT   TREATMENT with RADIATION THERAPIST   [facility] ([facility])    [address] [facility] [address] [phone]              Who to contact     If you have questions or need follow up information about today's clinic visit or your schedule please contact [facility] directly at [phone].  Normal or non-critical lab and imaging results will be communicated to you by [employer], letter or phone within 4 business days after the clinic has received the results. If you do not hear from us within 7 days, please contact the clinic through [facility] or phone. If you have a critical or abnormal lab result, we will notify you by phone as soon as possible.  Submit refill requests through [facility] or call your pharmacy and they will forward the refill request to us. Please allow 3 business days for your refill to be completed.          Additional Information About Your Visit        [facility] Information     [facility] gives you secure access to your electronic health record. If you see a primary care provider, you can also send messages to your care team and make appointments. If you have questions, please call your primary care clinic.  If you do not have a primary care provider, please call [phone] and they will assist you.      [facility] is an electronic gateway that provides easy, online access to your medical records. With [facility], you can request a clinic  appointment, read your test results, renew a prescription or communicate with your care team.     To access your existing account, please contact your [facility] or call [phone] for assistance.        Care EveryWhere ID     This is your Care EveryWhere ID. This could be used by other organizations to access your [facility] medical records  [id number]        Your Vitals Were     BMI (Body Mass Index)                   37.44 kg/m2            Blood Pressure from Last 3 Encounters:   04/12/17 134/84   03/27/17 (!) 142/96   03/17/17 (!) 140/96    Weight from Last 3 Encounters:   04/13/17 225 lb   04/12/17 225 lb 9.6 oz   04/06/17 226 lb              Today, you had the following     No orders found for display         Where to get your medicines      Some of these will need a paper prescription and others can be bought over the counter.  Ask your nurse if you have questions.     Bring a paper prescription for each of these medications     capecitabine 500 MG tablet CHEMO          Primary Care Provider Office Phone # Fax #    Mal GAVIN Ladd [phone]       [address] [address]        Thank you!     Thank you for choosing [facility]  for your care. Our goal is always to provide you with excellent care. Hearing back from our patients is one way we can continue to improve our services. Please take a few minutes to complete the written survey that you may receive in the mail after your visit with us. Thank you!             Your Updated Medication List - Protect others around you: Learn how to safely use, store and throw away your medicines at www.disposemymeds.org.          This list is accurate as of: 4/13/17 11:59 PM.  Always use your most recent med list.                   Brand Name Dispense Instructions for use    capecitabine 500 MG tablet CHEMO    XELODA    196 tablet    Take 4 tablets (2000mg) in the AM and 3 tablets (1500mg)  in the PM. Take for 5 days per week Monday-Friday. Do NOT take on Saturday-Sunday. Take with water within 30 minutes after meal.       LORazepam 0.5 MG tablet    ATIVAN    30 tablet    Take 1 tablet (0.5 mg) by mouth every 4 hours as needed (Anxiety, Nausea/Vomiting or Sleep)       prochlorperazine 10 MG tablet    COMPAZINE    30 tablet    Take 1 tablet (10 mg) by mouth every 6 hours as needed (Nausea/Vomiting)

## 2017-04-14 ENCOUNTER — APPOINTMENT (OUTPATIENT)
Dept: RADIATION ONCOLOGY | Facility: CLINIC | Age: 52
End: 2017-04-14
Payer: COMMERCIAL

## 2017-04-14 PROCEDURE — 77336 RADIATION PHYSICS CONSULT: CPT | Performed by: RADIOLOGY

## 2017-04-14 PROCEDURE — 77427 RADIATION TX MANAGEMENT X5: CPT | Performed by: RADIOLOGY

## 2017-04-14 PROCEDURE — 77412 RADIATION TX DELIVERY LVL 3: CPT | Performed by: RADIOLOGY

## 2017-04-14 NOTE — PROGRESS NOTES
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 Lion  Drug Name/Frequency 1: Xeloda    Subjective: Doing well. Slight ctd urgency with bowels and bladder otherwise no concerns.    Nursing ROS:   Nutrition Alteration  Diet Type: Patient's Preference  Skin  Skin Reaction: 0 - No changes  Skin Intervention: using Vanicream        Cardiovascular  Respiratory effort: 1 - Normal - without distress  Gastrointestinal  Nausea: 1 - One to two episodes of nausea/24  Vomitin - No vomiting (ons)  GI Note: patient reports slight urgency with bowel movements, small liquid bowel movements, taking Compazine and Ativan  Genitourinary  Urinary Status: 0 - Normal   Note: patient reports slight urgency with urination, denies burning or discomfort with urination  Psychosocial  Mood - Anxiety: 0 - Normal  Mood - Depression: 0 - Normal  Pyschosocial Note: increasing fatigue  Pain Assessment  0-10 Pain Scale: 0    Objective:   Wt 225 lb  BMI 37.44 kg/m2  Gen: Appears well, NAD  Skin: Mild diffuse erythema over treatment field    Labs:  CBC RESULTS:   Recent Labs   Lab Test  17   1539   WBC  3.7*   RBC  4.73   HGB  13.9   HCT  41.2   MCV  87   MCH  29.4   MCHC  33.7   RDW  15.0   PLT  185     ELECTROLYTES:  Recent Labs   Lab Test  17   1539   NA  136   POTASSIUM  4.0   CHLORIDE  104   MG  8.5   CO2  27   BUN  11   CR  0.93   GLC  106*       Assessment:    Tolerating radiation therapy well.  All questions and concerns addressed.    Plan:   1. Continue current therapy.         Mosaiq chart and setup information reviewed  Port images reviewed    Medication Review  Med list reviewed with patient?: Yes  Med list printed and given: Offered and declined    Educational Topic Discussed  Additional Instructions: patient has started using vaginal dilator, denies concerns  Education Instructions: follow-up with Dr. Lion on 4/19/2017, patient reports she will be one chemotherapy pill short, this RN discussed with Debo in oncology pharmacy, instructed on last day of chemotherapy to take Xeloda three tablets two times daily      Joce Lovelace M.D.  [facility]  Radiation Oncology    [phone] [facility]  [phone] pager

## 2017-04-17 ENCOUNTER — APPOINTMENT (OUTPATIENT)
Dept: RADIATION ONCOLOGY | Facility: CLINIC | Age: 52
End: 2017-04-17
Payer: COMMERCIAL

## 2017-04-17 PROCEDURE — 77334 RADIATION TREATMENT AID(S): CPT | Performed by: RADIOLOGY

## 2017-04-17 PROCEDURE — 77307 TELETHX ISODOSE PLAN CPLX: CPT | Performed by: RADIOLOGY

## 2017-04-17 PROCEDURE — 77412 RADIATION TX DELIVERY LVL 3: CPT | Performed by: RADIOLOGY

## 2017-04-18 ENCOUNTER — APPOINTMENT (OUTPATIENT)
Dept: RADIATION ONCOLOGY | Facility: CLINIC | Age: 52
End: 2017-04-18
Payer: COMMERCIAL

## 2017-04-18 PROCEDURE — 77412 RADIATION TX DELIVERY LVL 3: CPT | Performed by: RADIOLOGY

## 2017-04-18 PROCEDURE — 77014 ZZHC CT GUIDE FOR PLACEMENT RADIATION THERAPY FIELDS: CPT | Performed by: RADIOLOGY

## 2017-04-19 ENCOUNTER — ONCOLOGY VISIT (OUTPATIENT)
Dept: ONCOLOGY | Facility: CLINIC | Age: 52
End: 2017-04-19
Payer: COMMERCIAL

## 2017-04-19 ENCOUNTER — APPOINTMENT (OUTPATIENT)
Dept: RADIATION ONCOLOGY | Facility: CLINIC | Age: 52
End: 2017-04-19
Payer: COMMERCIAL

## 2017-04-19 VITALS
BODY MASS INDEX: 37.65 KG/M2 | HEART RATE: 95 BPM | WEIGHT: 226 LBS | OXYGEN SATURATION: 98 % | SYSTOLIC BLOOD PRESSURE: 143 MMHG | TEMPERATURE: 97.3 F | HEIGHT: 65 IN | RESPIRATION RATE: 15 BRPM | DIASTOLIC BLOOD PRESSURE: 94 MMHG

## 2017-04-19 DIAGNOSIS — C20 MALIGNANT NEOPLASM OF RECTUM (H): Primary | ICD-10-CM

## 2017-04-19 PROCEDURE — 77412 RADIATION TX DELIVERY LVL 3: CPT | Performed by: RADIOLOGY

## 2017-04-19 PROCEDURE — 99215 OFFICE O/P EST HI 40 MIN: CPT | Performed by: INTERNAL MEDICINE

## 2017-04-19 PROCEDURE — 77470 SPECIAL RADIATION TREATMENT: CPT | Performed by: RADIOLOGY

## 2017-04-19 ASSESSMENT — PAIN SCALES - GENERAL: PAINLEVEL: NO PAIN (0)

## 2017-04-19 NOTE — ORAL ONC MGMT
Oral Chemotherapy Monitoring Program  Primary Oncologist: Dr. Wisam Lion  Primary Oncology Clinic: [facility]  Cancer Diagnosis: Rectal Cancer     Drug: capecitabine (Xeloda) 2000 mg in AM and 1500 mg in PM M-F during XRT  Therapy History:  Start Date: 3/20/17  Expected duration of therapy: For 28 days during XRT M-F. Total of 56 doses.     Drug Interaction Assessment: No significant drug interactions identified upon review.     Lab Monitoring Plan  Day 22: CMP, CBC  Subjective/Objective:  Matthew Stover is a 51 year old female seen in clinic for a follow-up visit for oral chemotherapy.  She seen Dr Lion and I was not present.  Per Dr Lion's note, Matthew continue to tolerate his Xeloda with radiation.  Her last dose will be 4/26/17.       ORAL CHEMOTHERAPY 3/8/2017 3/23/2017 3/27/2017 4/13/2017 4/19/2017   Drug Name Xeloda (Capecitabine) Xeloda (Capecitabine) Xeloda (Capecitabine) Xeloda (Capecitabine) Xeloda (Capecitabine)   Current Dosage Other Other Other Other Other   Current Schedule BID BID BID BID BID   Cycle Details M-F only during XRT M-F only during XRT M-F only during XRT M-F only during XRT M-F only during XRT   Start Date of Last Cycle - 3/20/2017 3/20/2017 3/20/2017 3/20/2017   Planned next cycle start date 3/20/2017 - - - -   Doses missed in last 2 weeks - 0 0 0 0   Adherence Assessment - Adherent Adherent Adherent Adherent   Adverse Effects - Nausea Nausea;Palmar-plantar erythrodysethesia syndrome Nausea -   Nausea - - Resolved due to intervention Grade 1 -   Pharmacist Intervention(nausea) - Yes - No -   Intervention(s) - Patient education - - -   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 - Yes - - -   Intervention(s) - Rx medication recommendation - - -   Any new drug interactions? No  "No No No No   Is the dose as ordered appropriate for the patient? Yes - - - -   Is the patient currently in pain? Assessed in last 30 days. - Assessed in last 30 days. Assessed in last 30 days. Assessed in last 30 days.   Has the patient been assessed within the past 6 months for depression? Yes - Yes Yes Yes   Has the patient missed any days of school, work, or other routine activity? - No - - -     Vitals:  BP:   BP Readings from Last 1 Encounters:   04/19/17 (!) 143/94     Wt Readings from Last 1 Encounters:   04/19/17 102.5 kg (226 lb)     Estimated body surface area is 2.17 meters squared as calculated from the following:    Height as of an earlier encounter on 4/19/17: 1.651 m (5' 5\").    Weight as of an earlier encounter on 4/19/17: 102.5 kg (226 lb).    Labs:  Lab Results   Component Value Date     04/12/2017      Lab Results   Component Value Date    POTASSIUM 4.0 04/12/2017     Lab Results   Component Value Date    MG 8.5 04/12/2017     Lab Results   Component Value Date    ALBUMIN 3.4 04/12/2017     No results found for: MAG  No results found for: PHOS  Lab Results   Component Value Date    BUN 11 04/12/2017     Lab Results   Component Value Date    CR 0.93 04/12/2017       Lab Results   Component Value Date    AST 18 04/12/2017     Lab Results   Component Value Date    ALT 20 04/12/2017     Lab Results   Component Value Date    BILITOTAL 0.5 04/12/2017       Lab Results   Component Value Date    WBC 3.7 04/12/2017     Lab Results   Component Value Date    HGB 13.9 04/12/2017     Lab Results   Component Value Date     04/12/2017     Lab Results   Component Value Date    ANEU 2.8 04/12/2017       Assessment/Plan:  No change at this time.  Matthew will complete Xeloda plus radiation on 4/26/17    Refill Due: none needed    Jose R Chambers, PharmD  April 19, 2017    "

## 2017-04-19 NOTE — NURSING NOTE
"Matthew Stover is a 51 year old female who presents for:  Chief Complaint   Patient presents with     Oncology Clinic Visit     follow up        Initial Vitals:  BP (!) 143/94  Pulse 95  Temp 97.3  F (36.3  C)  Resp 15  Ht 1.651 m (5' 5\")  Wt 102.5 kg (226 lb)  SpO2 98%  BMI 37.61 kg/m2 Estimated body mass index is 37.61 kg/(m^2) as calculated from the following:    Height as of this encounter: 1.651 m (5' 5\").    Weight as of this encounter: 102.5 kg (226 lb).. Body surface area is 2.17 meters squared. BP completed using cuff size: large  No Pain (0) No LMP recorded. Patient has had an ablation. Allergies and medications reviewed.     Medications: Medication refills not needed today.  Pharmacy name entered into [employer]: [id number] [place], [place] - [address] [address]    Comments:     5 minutes for nursing intake (face to face time)   Tonie Buitrago LPN        "

## 2017-04-19 NOTE — PATIENT INSTRUCTIONS
Cont Xeloda as scheduled  Please also take compazine in the afternoon  See me back on 4/26 @ 145 pm or 215 pm    Schedule CT CAP and MRI Pelvis around 6/12/17. See your surgeon after the scans and then see me for follow up the week after that

## 2017-04-19 NOTE — PROGRESS NOTES
2017    CHIEF COMPLAINT:  Follow up for cT3 N2 Stage IIIB or IIIC moderately differentiated adenocarcinoma of the rectum.  MSI is intact.      Colorectal Surgeon:  Dr. Lopez at [facility]  Primary Radiation Oncologist: Dr. Lovelace     HISTORY OF PRESENT ILLNESS:    Please see my prior note for details. She was recently diagnosed with locally advanced rectal cancer.  She started neoadjuvant treatment with with Xeloda 2000mg in AM and 1500mg in PM along with radiotherapy on 3/20/17    INTERVAL HISTORY  She comes in today, and tells me that she is feeling about the same as before, apart from feeling more tired.  She thinks her nausea is about the same.  She is using Compazine in the morning and at night, and also takes Ativan at night.  She has not tried the Compazine in the afternoon.  Otherwise, she is able to eat and drink well without any vomiting.  She has been able to eat.  She has been able to maintain her weight.  She also noticed some difference in how her mouth feels.  She denies that this is soreness or sore, but just feels different, so start doing salt and water rinses, and that has helped.  She tells me now she is sleeping better.  Her bowel movements are okay without any bleeding.  Denies any other pain.  No new swellings.  No new skin problems.  She is applying lotion to her hands and feet, and has had no hand-foot syndrome.  No interval infections.       ROS:  Otherwise a comprehensive ROS was negative      I reviewed other hx in [facility] as below    PAST MEDICAL HISTORY:  She had a hernia repair at 3 months,  in , left knee meniscus surgery in , appendix surgery in , and fibroadenoma removed from the right breast in 2016.      FAMILY HISTORY:  Mother had uterine cancer.  The patient has 2 kids who are healthy.      SOCIAL HISTORY:  She does not smoke.  Drinks alcohol occasionally.  Works in the [employer].  She lives with her  and 2 kids.      ALLERGIES:  " None.      MEDICATIONS:  Reviewed    PHYSICAL EXAM  BP (!) 143/94  Pulse 95  Temp 97.3  F (36.3  C)  Resp 15  Ht 1.651 m (5' 5\")  Wt 102.5 kg (226 lb)  SpO2 98%  BMI 37.61 kg/m2  CONSTITUTIONAL:  She is a middle-aged female, pleasant, in no apparent distress.   HEENT:  Pupils are equally reactive to light and accommodation without any pallor or icterus.   ENT:  Ears normal.  Mouth without any oral lesions or thrush.     CARDIOVASCULAR:  S1, S2 is audible, normal.  No murmurs, rubs or gallops.   RESPIRATORY:  Chest is clear to auscultation bilaterally.   GASTROINTESTINAL:  Abdomen is soft and nontender, no hepatosplenomegaly.   LYMPHATICS:  No palpable lymph nodes.   EXTREMITIES:  No pedal edema.   NEUROLOGIC:  Nonfocal.     INTEGUMENT:  Without any concerning skin rashes.   PSYCHIATRIC:  Mood and affect are appropriate.       LABORATORY DATA:    Reviewed from 4/12 and stable   Previous CEA was 5.2.         ASSESSMENT AND PLAN:     1.  She has stage IIIB or IIIC moderately differentiated adenocarcinoma of the rectum.  MSI is intact.  It is a cT3 N2 disease.  I am not sure if it is an N2b or N2c disease at this time.    She will continue with carmela-adjuvant concurrent chemotherapy and radiation with oral Xeloda. She started on 3/20/17. Anticipated date of completion is 4/26/17    After completing carmela-adjuvant therapy, we will repeat imaging with CT CAP and Pelvis MRI around mid June. Hopefully after that she can proceed with surgery with adjuvant chemotherapy thereafter    1.  Nausea.  We talked about nausea in detail today.  I told her to take Compazine in the morning and then again in the afternoon, and take Ativan at night, and take as-needed antiemetics on top of it.   2.  For the different feeling in her mouth, we discussed about doing salt and baking soda mouth rinses to help prevent mouth sores.   3.  We also talked about prevention of hand-foot syndrome.  Currently, she does not have any evidence of " that.  I encouraged her to continue using moisturizing lotions to her hands and feet.   4.  We also discussed about fatigue.  I encouraged her to exercise routinely, and be active to help her with the fatigue.       5.  She is going to see a genetic counselor on 5/10/17 ( not addressed to day )     I will see her back on 4/26    All of her questions were answered to her satisfaction, and she is agreeable and comfortable with this plan.     Wisam Lion

## 2017-04-19 NOTE — MR AVS SNAPSHOT
After Visit Summary   4/19/2017    Matthew Stover    MRN: [id number]           Patient Information     Date Of Birth          1965        Visit Information        Provider Department      4/19/2017 8:45 AM Wisam Lion MD [facility]        Today's Diagnoses     Malignant neoplasm of rectum (H)    -  1      Care Instructions    Cont Xeloda as scheduled  Please also take compazine in the afternoon  See me back on 4/26 @ 145 pm or 215 pm    Schedule CT CAP and MRI Pelvis around 6/12/17. See your surgeon after the scans and then see me for follow up the week after that            Follow-ups after your visit        Your next 10 appointments already scheduled     Apr 20, 2017  3:15 PM CDT   TREATMENT with RADIATION THERAPIST   [facility] ([facility])    [address] [facility] [address]   [phone]            Apr 20, 2017  3:30 PM CDT   on treatment visit with Joce Lovelace MD   [facility])    [address] [facility] [address]   [phone]            Apr 21, 2017  3:15 PM CDT   TREATMENT with RADIATION THERAPIST   [facility] ([facility])    [address] [facility] [address]   [phone]            Apr 24, 2017  3:15 PM CDT   TREATMENT with RADIATION THERAPIST   [facility] ([facility])    [address] [facility] [address]   [phone]            Apr 25, 2017  3:15 PM CDT   TREATMENT with RADIATION THERAPIST   [facility] ([facility])    [address] [facility] [address]   [phone]            Apr 26, 2017  2:15 PM CDT   Return Visit with Wisam Lion MD   [facility])    [address] [facility] [address]   [phone]            Apr 26, 2017  3:15 PM  CDT   TREATMENT with RADIATION THERAPIST   [facility] ([facility])    [address] [facility] [address]   [phone]            Apr 26, 2017  3:30 PM CDT   on treatment visit with Joce Lovelace MD   [facility] ([facility])    [address] [facility] [address]   [phone]            May 10, 2017  2:15 PM CDT   New Visit with Latosha Purdy GC   [facility] ([facility])    [address] [place] [address]   [phone]            Jun 12, 2017 10:15 AM CDT   MR PELVIS W/O & W CONTRAST with MGMR1   [facility] ([facility])    [address] [place] [address]   [phone]           Take your medicines as usual, unless your doctor tells you not to. Bring a list of your current medicines to your exam (including vitamins, minerals and over-the-counter drugs).  You will be given intravenous contrast for this exam. To prepare:   The day before your exam, drink extra fluids at least six 8-ounce glasses (unless your doctor tells you to restrict your fluids).   Have a blood test (creatinine test) within 30 days of your exam. Go to your clinic or Diagnostic Imaging Department for this test.  The MRI machine uses a strong magnet. Please wear clothes without metal (snaps, zippers). A sweatsuit works well, or we may give you a hospital gown.  Please remove any body piercings and hair extensions before you arrive. You will also remove watches, jewelry, hairpins, wallets, dentures, partial dental plates and hearing aids. You may wear contact lenses, and you may be able to wear your rings. We have a safe place to keep your personal items, but it is safer to leave them at home.   **IMPORTANT** THE INSTRUCTIONS BELOW ARE ONLY FOR THOSE PATIENTS WHO HAVE BEEN TOLD THEY WILL RECEIVE SEDATION OR GENERAL ANESTHESIA DURING THEIR MRI  PROCEDURE:  IF YOU WILL RECEIVE SEDATION (take medicine to help you relax during your exam):   You must get the medicine from your doctor before you arrive. Bring the medicine to the exam. Do not take it at home.   Arrive one hour early. Bring someone who can take you home after the test. Your medicine will make you sleepy. After the exam, you may not drive, take a bus or take a taxi by yourself.   No eating 8 hours before your exam. You may have clear liquids up until 4 hours before your exam. (Clear liquids include water, clear tea, black coffee and fruit juice without pulp.)  IF YOU WILL RECEIVE ANESTHESIA (be asleep for your exam):   Arrive 1 1/2 hours early. Bring someone who can take you home after the test. You may not drive, take a bus or take a taxi by yourself.   No eating 8 hours before your exam. You may have clear liquids up until 4 hours before your exam. (Clear liquids include water, clear tea, black coffee and fruit juice without pulp.)  Please call the Imaging Department at your exam site with any questions.              Future tests that were ordered for you today     Open Future Orders        Priority Expected Expires Ordered    MRI Pelvis w & w/o contrast Routine 6/12/2017 7/18/2017 4/19/2017    CT Chest abdomen pelvis w & w/o contrast Routine 6/12/2017 4/19/2018 4/19/2017            Who to contact     If you have questions or need follow up information about today's clinic visit or your schedule please contact [facility] directly at [phone].  Normal or non-critical lab and imaging results will be communicated to you by [employer], letter or phone within 4 business days after the clinic has received the results. If you do not hear from us within 7 days, please contact the clinic through [employer] or phone. If you have a critical or abnormal lab result, we will notify you by phone as soon as possible.  Submit refill requests through [employer] or call your pharmacy and they will  "forward the refill request to us. Please allow 3 business days for your refill to be completed.          Additional Information About Your Visit        Avol[employer]arCare[employer]x Information     [employer] gives you secure access to your electronic health record. If you see a primary care provider, you can also send messages to your care team and make appointments. If you have questions, please call your primary care clinic.  If you do not have a primary care provider, please call [phone] and they will assist you.      [employer] is an electronic gateway that provides easy, online access to your medical records. With [employer], you can request a clinic appointment, read your test results, renew a prescription or communicate with your care team.     To access your existing account, please contact your [facility] or call [phone] for assistance.        Care EveryWhere ID     This is your Care EveryWhere ID. This could be used by other organizations to access your [place] medical records  [id number]        Your Vitals Were     Pulse Temperature Respirations Height Pulse Oximetry BMI (Body Mass Index)    95 97.3  F (36.3  C) 15 1.651 m (5' 5\") 98% 37.61 kg/m2       Blood Pressure from Last 3 Encounters:   04/19/17 (!) 143/94   04/12/17 134/84   03/27/17 (!) 142/96    Weight from Last 3 Encounters:   04/19/17 102.5 kg (226 lb)   04/13/17 102.1 kg (225 lb)   04/12/17 102.3 kg (225 lb 9.6 oz)               Primary Care Provider Office Phone # Fax #    Mal GAVIN Ladd [phone]       [address] [address]        Thank you!     Thank you for choosing [facility]  for your care. Our goal is always to provide you with excellent care. Hearing back from our patients is one way we can continue to improve our services. Please take a few minutes to complete the written survey that you may receive in the mail after your visit with us. Thank you!             Your " Updated Medication List - Protect others around you: Learn how to safely use, store and throw away your medicines at www.disposemymeds.org.          This list is accurate as of: 4/19/17 11:59 PM.  Always use your most recent med list.                   Brand Name Dispense Instructions for use    capecitabine 500 MG tablet CHEMO    XELODA    196 tablet    Take 4 tablets (2000mg) in the AM and 3 tablets (1500mg) in the PM. Take for 5 days per week Monday-Friday. Do NOT take on Saturday-Sunday. Take with water within 30 minutes after meal.       LORazepam 0.5 MG tablet    ATIVAN    30 tablet    Take 1 tablet (0.5 mg) by mouth every 4 hours as needed (Anxiety, Nausea/Vomiting or Sleep)       prochlorperazine 10 MG tablet    COMPAZINE    30 tablet    Take 1 tablet (10 mg) by mouth every 6 hours as needed (Nausea/Vomiting)

## 2017-04-20 ENCOUNTER — OFFICE VISIT (OUTPATIENT)
Dept: RADIATION ONCOLOGY | Facility: CLINIC | Age: 52
End: 2017-04-20
Payer: COMMERCIAL

## 2017-04-20 ENCOUNTER — APPOINTMENT (OUTPATIENT)
Dept: RADIATION ONCOLOGY | Facility: CLINIC | Age: 52
End: 2017-04-20
Payer: COMMERCIAL

## 2017-04-20 VITALS — WEIGHT: 226 LBS | BODY MASS INDEX: 37.61 KG/M2

## 2017-04-20 DIAGNOSIS — C20 MALIGNANT NEOPLASM OF RECTUM (H): Primary | ICD-10-CM

## 2017-04-20 PROCEDURE — 77280 THER RAD SIMULAJ FIELD SMPL: CPT | Performed by: RADIOLOGY

## 2017-04-20 PROCEDURE — 99207 ZZC NO BILLABLE SERVICE THIS VISIT: CPT | Performed by: RADIOLOGY

## 2017-04-20 PROCEDURE — 77412 RADIATION TX DELIVERY LVL 3: CPT | Performed by: RADIOLOGY

## 2017-04-20 ASSESSMENT — PAIN SCALES - GENERAL: PAINLEVEL: NO PAIN (0)

## 2017-04-20 NOTE — PATIENT INSTRUCTIONS
Please contact [facility] RN with questions or concerns following today's appointment: [phone].    Thank you!

## 2017-04-20 NOTE — MR AVS SNAPSHOT
After Visit Summary   4/20/2017    Matthew Stover    MRN: [id number]           Patient Information     Date Of Birth          1965        Visit Information        Provider Department      4/20/2017 3:30 PM Joce Lovelace MD [facility]        Today's Diagnoses     Malignant neoplasm of rectum (H)    -  1      Care Instructions    [facility] with questions or concerns following today's appointment: [phone].    Thank you!          Follow-ups after your visit        Your next 10 appointments already scheduled     May 10, 2017  2:15 PM CDT   New Visit with Latosha Purdy GC   [facility]    [address] [place] [address] [phone]            May 24, 2017  9:00 AM CDT   Return Visit with NOELLE Childs CNP   [facility]    [address] [place] [address] [phone]              Who to contact     If you have questions or need follow up information about today's clinic visit or your schedule please contact [facility] directly at [phone].  Normal or non-critical lab and imaging results will be communicated to you by ScoopStakehart, letter or phone within 4 business days after the clinic has received the results. If you do not hear from us within 7 days, please contact the clinic through [employer] or phone. If you have a critical or abnormal lab result, we will notify you by phone as soon as possible.  Submit refill requests through [employer] or call your pharmacy and they will forward the refill request to us. Please allow 3 business days for your refill to be completed.          Additional Information About Your Visit        ScoopStakehart Information     [employer] gives you secure access to your electronic health record. If you see a primary care provider, you can also send messages to  your care team and make appointments. If you have questions, please call your primary care clinic.  If you do not have a primary care provider, please call [phone] and they will assist you.      shenzhoufu is an electronic gateway that provides easy, online access to your medical records. With shenzhoufu, you can request a clinic appointment, read your test results, renew a prescription or communicate with your care team.     To access your existing account, please contact your [facility] or call [phone] for assistance.        Care EveryWhere ID     This is your Care EveryWhere ID. This could be used by other organizations to access your [place] medical records  [id number]        Your Vitals Were     BMI (Body Mass Index)                   37.61 kg/m2            Blood Pressure from Last 3 Encounters:   04/26/17 (!) 136/97   04/19/17 (!) 143/94   04/12/17 134/84    Weight from Last 3 Encounters:   04/26/17 225 lb   04/26/17 225 lb   04/20/17 226 lb              Today, you had the following     No orders found for display       Primary Care Provider Office Phone # Fax #    Mal Ladd [phone]       [address] [address]        Thank you!     Thank you for choosing [facility]  for your care. Our goal is always to provide you with excellent care. Hearing back from our patients is one way we can continue to improve our services. Please take a few minutes to complete the written survey that you may receive in the mail after your visit with us. Thank you!             Your Updated Medication List - Protect others around you: Learn how to safely use, store and throw away your medicines at www.disposemymeds.org.          This list is accurate as of: 4/20/17 11:59 PM.  Always use your most recent med list.                   Brand Name Dispense Instructions for use    capecitabine 500 MG tablet CHEMO    XELODA    196 tablet     Take 4 tablets (2000mg) in the AM and 3 tablets (1500mg) in the PM. Take for 5 days per week Monday-Friday. Do NOT take on Saturday-Sunday. Take with water within 30 minutes after meal.       LORazepam 0.5 MG tablet    ATIVAN    30 tablet    Take 1 tablet (0.5 mg) by mouth every 4 hours as needed (Anxiety, Nausea/Vomiting or Sleep)       prochlorperazine 10 MG tablet    COMPAZINE    30 tablet    Take 1 tablet (10 mg) by mouth every 6 hours as needed (Nausea/Vomiting)

## 2017-04-21 ENCOUNTER — APPOINTMENT (OUTPATIENT)
Dept: RADIATION ONCOLOGY | Facility: CLINIC | Age: 52
End: 2017-04-21
Payer: COMMERCIAL

## 2017-04-21 PROCEDURE — 77336 RADIATION PHYSICS CONSULT: CPT | Performed by: RADIOLOGY

## 2017-04-21 PROCEDURE — 77427 RADIATION TX MANAGEMENT X5: CPT | Performed by: RADIOLOGY

## 2017-04-21 PROCEDURE — 77412 RADIATION TX DELIVERY LVL 3: CPT | Performed by: RADIOLOGY

## 2017-04-24 ENCOUNTER — APPOINTMENT (OUTPATIENT)
Dept: RADIATION ONCOLOGY | Facility: CLINIC | Age: 52
End: 2017-04-24
Payer: COMMERCIAL

## 2017-04-24 PROCEDURE — 77014 ZZHC CT GUIDE FOR PLACEMENT RADIATION THERAPY FIELDS: CPT | Performed by: RADIOLOGY

## 2017-04-24 PROCEDURE — 77412 RADIATION TX DELIVERY LVL 3: CPT | Performed by: RADIOLOGY

## 2017-04-25 ENCOUNTER — APPOINTMENT (OUTPATIENT)
Dept: RADIATION ONCOLOGY | Facility: CLINIC | Age: 52
End: 2017-04-25
Payer: COMMERCIAL

## 2017-04-25 PROCEDURE — 77412 RADIATION TX DELIVERY LVL 3: CPT | Performed by: RADIOLOGY

## 2017-04-26 ENCOUNTER — OFFICE VISIT (OUTPATIENT)
Dept: RADIATION ONCOLOGY | Facility: CLINIC | Age: 52
End: 2017-04-26
Payer: COMMERCIAL

## 2017-04-26 ENCOUNTER — ONCOLOGY VISIT (OUTPATIENT)
Dept: ONCOLOGY | Facility: CLINIC | Age: 52
End: 2017-04-26
Payer: COMMERCIAL

## 2017-04-26 VITALS — WEIGHT: 225 LBS | BODY MASS INDEX: 37.44 KG/M2

## 2017-04-26 VITALS
WEIGHT: 225 LBS | TEMPERATURE: 97.5 F | RESPIRATION RATE: 20 BRPM | SYSTOLIC BLOOD PRESSURE: 136 MMHG | OXYGEN SATURATION: 96 % | HEIGHT: 65 IN | HEART RATE: 100 BPM | BODY MASS INDEX: 37.49 KG/M2 | DIASTOLIC BLOOD PRESSURE: 97 MMHG

## 2017-04-26 DIAGNOSIS — C20 MALIGNANT NEOPLASM OF RECTUM (H): Primary | ICD-10-CM

## 2017-04-26 PROCEDURE — 77427 RADIATION TX MANAGEMENT X5: CPT | Performed by: RADIOLOGY

## 2017-04-26 PROCEDURE — 99215 OFFICE O/P EST HI 40 MIN: CPT | Performed by: INTERNAL MEDICINE

## 2017-04-26 PROCEDURE — 77412 RADIATION TX DELIVERY LVL 3: CPT | Performed by: RADIOLOGY

## 2017-04-26 PROCEDURE — 77014 ZZHC CT GUIDE FOR PLACEMENT RADIATION THERAPY FIELDS: CPT | Performed by: RADIOLOGY

## 2017-04-26 PROCEDURE — 99207 ZZC NO BILLABLE SERVICE THIS VISIT: CPT | Performed by: RADIOLOGY

## 2017-04-26 ASSESSMENT — PAIN SCALES - GENERAL
PAINLEVEL: NO PAIN (0)
PAINLEVEL: NO PAIN (0)

## 2017-04-26 NOTE — MR AVS SNAPSHOT
After Visit Summary   4/26/2017    Matthew Stover    MRN: [id number]           Patient Information     Date Of Birth          1965        Visit Information        Provider Department      4/26/2017 3:30 PM Joce Lovelace MD [facility]        Today's Diagnoses     Malignant neoplasm of rectum (H)    -  1      Care Instructions          Managing radiation side effects after treatment has ended    The side effects of radiation therapy should gradually decrease in 2 to 3 weeks after you have finished radiation.  Some effects take longer to resolve.    Fatigue caused by radiation therapy will decrease and your energy will improve.    Skin reactions:  Skin changes (such as redness or irritation) will slowly begin to get better. Some people may have a permanent change in skin color.  Their skin may be more pink or  tan  than the untreated skin. The skin may be thinner or more fragile than before treatment.  Continue to use a gentle moisturizing lotion for several months.  You should always protect the skin in the area that was treated by using sunscreen of SPF30 or higher.      Other skin care instructions: continue with daily skin cares for the next one month.        For concerns or questions call [facility] [phone]          Follow-ups after your visit        Your next 10 appointments already scheduled     May 10, 2017  2:15 PM CDT   New Visit with Latosha Purdy GC   [facility] ([facility])    [address] [place] [address] [phone]            May 24, 2017  9:00 AM CDT   Return Visit with NOELLE Childs CNP   [facility])    [address] [place] [address] [phone]              Who to contact     If you have questions or need follow up information about today's clinic visit or your schedule please contact [facility] directly at [phone].  Normal or non-critical lab and imaging results will be communicated to you by Beta Dashhart, letter or phone within 4 business days after the clinic has received the results. If you do not hear from us within 7 days, please contact the clinic through Beta Dashhart or phone. If you have a critical or abnormal lab result, we will notify you by phone as soon as possible.  Submit refill requests through [employer] or call your pharmacy and they will forward the refill request to us. Please allow 3 business days for your refill to be completed.          Additional Information About Your Visit        [employer] Information     [employer] gives you secure access to your electronic health record. If you see a primary care provider, you can also send messages to your care team and make appointments. If you have questions, please call your primary care clinic.  If you do not have a primary care provider, please call [phone] and they will assist you.      [employer] is an electronic gateway that provides easy, online access to your medical records. With [employer], you can request a clinic appointment, read your test results, renew a prescription or communicate with your care team.     To access your existing account, please contact your [facility] or call [phone] for assistance.        Care EveryWhere ID     This is your Care EveryWhere ID. This could be used by other organizations to access your [place] medical records  [id number]        Your Vitals Were     BMI (Body Mass Index)                   37.44 kg/m2            Blood Pressure from Last 3 Encounters:   04/26/17 (!) 136/97   04/19/17 (!) 143/94   04/12/17 134/84    Weight from Last 3 Encounters:   04/26/17 225 lb   04/26/17 225 lb   04/20/17 226 lb              Today, you had the following     No orders found for display       Primary Care Provider Office Phone # Fax #    Mal Ladd [phone]  [phone]       [address] [address]        Thank you!     Thank you for choosing [facility]  for your care. Our goal is always to provide you with excellent care. Hearing back from our patients is one way we can continue to improve our services. Please take a few minutes to complete the written survey that you may receive in the mail after your visit with us. Thank you!             Your Updated Medication List - Protect others around you: Learn how to safely use, store and throw away your medicines at www.disposemymeds.org.          This list is accurate as of: 4/26/17 11:59 PM.  Always use your most recent med list.                   Brand Name Dispense Instructions for use    capecitabine 500 MG tablet CHEMO    XELODA    196 tablet    Take 4 tablets (2000mg) in the AM and 3 tablets (1500mg) in the PM. Take for 5 days per week Monday-Friday. Do NOT take on Saturday-Sunday. Take with water within 30 minutes after meal.       LORazepam 0.5 MG tablet    ATIVAN    30 tablet    Take 1 tablet (0.5 mg) by mouth every 4 hours as needed (Anxiety, Nausea/Vomiting or Sleep)       prochlorperazine 10 MG tablet    COMPAZINE    30 tablet    Take 1 tablet (10 mg) by mouth every 6 hours as needed (Nausea/Vomiting)

## 2017-04-26 NOTE — MR AVS SNAPSHOT
After Visit Summary   4/26/2017    Matthew Stover    MRN: [id number]           Patient Information     Date Of Birth          1965        Visit Information        Provider Department      4/26/2017 2:15 PM Wisam Lion MD [facility]        Today's Diagnoses     Malignant neoplasm of rectum (H)    -  1      Care Instructions    Stop Xeloda after today    See me back in mid June and then 3-4 weeks after surgery with labs prior            Follow-ups after your visit        Your next 10 appointments already scheduled     May 10, 2017  2:15 PM CDT   [facility] Latosha Purdy [facility]   [facility]    [address] [place] [address] [id number] [phone]            May 24, 2017  9:00 AM CDT   Return Visit with NOELLE Childs CNP   [facility]    [address] [place] [id number] [phone]            Jun 20, 2017 12:15 PM CDT   [facility] Visit with Wisam Lion MD   [facility]    [address] [place] [address] [phone]              Who to contact     If you have questions or need follow up information about today's clinic visit or your schedule please contact [facility] directly at [phone].  Normal or non-critical lab and imaging results will be communicated to you by MyChart, letter or phone within 4 business days after the clinic has received the results. If you do not hear from us within 7 days, please contact the clinic through MyChart or phone. If you have a critical or abnormal lab result, we will notify you by phone as soon as possible.  Submit refill requests through [employer] or call your pharmacy and they will forward the refill request to us. Please allow 3 business days for your refill to be completed.          Additional Information About Your Visit       "  MyChart Information     [employer] gives you secure access to your electronic health record. If you see a primary care provider, you can also send messages to your care team and make appointments. If you have questions, please call your primary care clinic.  If you do not have a primary care provider, please call [phone] and they will assist you.      [employer] is an electronic gateway that provides easy, online access to your medical records. With [employer], you can request a clinic appointment, read your test results, renew a prescription or communicate with your care team.     To access your existing account, please contact your [facility] or call [phone] for assistance.        Care EveryWhere ID     This is your Care EveryWhere ID. This could be used by other organizations to access your [place] medical records  [id number]        Your Vitals Were     Pulse Temperature Respirations Height Pulse Oximetry BMI (Body Mass Index)    100 97.5  F (36.4  C) (Oral) 20 1.651 m (5' 5\") 96% 37.44 kg/m2       Blood Pressure from Last 3 Encounters:   04/26/17 (!) 136/97   04/19/17 (!) 143/94   04/12/17 134/84    Weight from Last 3 Encounters:   04/26/17 102.1 kg (225 lb)   04/26/17 102.1 kg (225 lb)   04/20/17 102.5 kg (226 lb)              Today, you had the following     No orders found for display       Primary Care Provider Office Phone # Fax #    Mal Ladd [phone]       [address] [address]        Thank you!     Thank you for choosing [facility]  for your care. Our goal is always to provide you with excellent care. Hearing back from our patients is one way we can continue to improve our services. Please take a few minutes to complete the written survey that you may receive in the mail after your visit with us. Thank you!             Your Updated Medication List - Protect others around you: Learn how to safely use, store " and throw away your medicines at www.disposemymeds.org.          This list is accurate as of: 4/26/17 11:59 PM.  Always use your most recent med list.                   Brand Name Dispense Instructions for use    capecitabine 500 MG tablet CHEMO    XELODA    196 tablet    Take 4 tablets (2000mg) in the AM and 3 tablets (1500mg) in the PM. Take for 5 days per week Monday-Friday. Do NOT take on Saturday-Sunday. Take with water within 30 minutes after meal.       LORazepam 0.5 MG tablet    ATIVAN    30 tablet    Take 1 tablet (0.5 mg) by mouth every 4 hours as needed (Anxiety, Nausea/Vomiting or Sleep)       prochlorperazine 10 MG tablet    COMPAZINE    30 tablet    Take 1 tablet (10 mg) by mouth every 6 hours as needed (Nausea/Vomiting)

## 2017-04-26 NOTE — PATIENT INSTRUCTIONS
Stop Xeloda after today    See me back in mid June and then 3-4 weeks after surgery with labs prior

## 2017-04-26 NOTE — PROGRESS NOTES
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  MSI is intact.      Colorectal Surgeon:  Dr. Lopez at [facility]  Primary Radiation Oncologist: Dr. Lovelace     HISTORY OF PRESENT ILLNESS:    Please see my prior note for details. She was recently diagnosed with locally advanced rectal cancer.  She started neoadjuvant treatment with with Xeloda 2000mg in AM and 1500mg in PM along with radiotherapy on 3/20/17.      INTERVAL HISTORY  Matthew comes in today accompanied by her .  She is finishing concurrent Xeloda with radiation today.  She is doing well.  She does have some nausea, but she thinks it is a little better.  Over the weekend, she did have a couple of episodes where she threw up, but currently she is not vomiting.  She has been able to eat and drink well, and has maintained her weight.  She feels a little tired today.  She took off from her work this week.  She has not noticed any pain.  She has not noticed any new lumps, bumps or swellings.  No infections.  She does not have any mouth sores or any hand-foot syndrome symptoms.  She is applying moisturizing creams.  Her bowel movements are soft/loose, she is not constipated.  No bleeding.  She tells me that she spoke with a surgeon, and the plan is to repeat her scans on 05/15 at [facility].  Here, initially she was scheduled to get repeat scans on .       ROS:  A comprehensive ROS was otherwise neg        I reviewed other hx in [facility] as below    PAST MEDICAL HISTORY:  She had a hernia repair at 3 months,  in , left knee meniscus surgery in , appendix surgery in , and fibroadenoma removed from the right breast in 2016.      FAMILY HISTORY:  Mother had uterine cancer.  The patient has 2 kids who are healthy.      SOCIAL HISTORY:  She does not smoke.  Drinks alcohol occasionally.  Works in the [employer].  She lives with her  and 2  "kids.      ALLERGIES:  None.      MEDICATIONS:  Reviewed    PHYSICAL EXAM  BP (!) 136/97  Pulse 100  Temp 97.5  F (36.4  C) (Oral)  Resp 20  Ht 1.651 m (5' 5\")  Wt 102.1 kg (225 lb)  SpO2 96%  BMI 37.44 kg/m2  CONSTITUTIONAL: no acute distress  EYES: PERRLA, no palor or icterus.   ENT/MOUTH: no mouth lesions. Oropharynx normal  CVS: s1s2 no m r g .   RESPIRATORY: clear to auscultation b/l  GI: soft non tender no hepatosplenomegaly  NEURO: AAOX3  Grossly non focal neuro exam  INTEGUMENT: no obvious rashes  LYMPHATIC: no palpable cervical, supraclavicular, axillary or inguinal LAD  MUSCULOSKELETAL: Unremarkable. No bony tenderness.   EXTREMITIES: no edema  PSYCH: Mentation, mood and affect are normal. Decision making capacity is intact        LABORATORY DATA:    Previous labs reviewed      ASSESSMENT AND PLAN:     1.  She has stage IIIB or IIIC moderately differentiated adenocarcinoma of the rectum.  MSI is intact.  It is a cT3 N2 disease.  I am not sure if it is an N2b or N2c disease at this time.    She is completing her carmela-adjuvant treatment today, i.e 4/26/2017  ASSESSMENT AND PLAN:    1.  She tells me that she has repeat scans scheduled for 05/15 and she is going to see the surgeon on 05/22.  I recommended to her that she talks with her surgeon again because I would like to do scans a few weeks later than that to make sure that we get the full radiation effect before we repeat the scans.  I would prefer if the scans could be delayed at least a couple of more weeks for the end of May or early June rather than for 05/15.  She is going to discuss with her surgeon regarding that and then follow with the surgeon.  After the scan, she will follow up with me.  I also told her that after the surgery I would like to see her back in about 3-4 weeks after surgery to see how she is doing and discuss further adjuvant treatment options.   2.  We discussed again about nausea.  Today, it seems to be a little better.  Her " nausea should start to get better now that she would be off treatment.  In the meantime, she will continue using the Compazine as needed.     3.  Prevention of hand-foot syndrome.  Fortunately, she did not have any evidence of hand-foot syndrome.  I told her to keep on using moisturizing creams for the next few days and then stop it.   4.  We discussed about her tiredness and hopefully this should also start to get better now that she is off treatment.  I told her that at times it takes a couple of weeks before she would see improvement in her energy levels.  I encouraged her to keep herself active and exercise regularly.   5.  We also discussed the importance of maintaining good nutrition during this time so as to recuperate well.     6.  She has an appointment with a genetic counselor on 05/10/2017.  We did not address that today.      All of her questions were answered to her satisfaction.  She is agreeable and comfortable with this plan.       Wisam Lion

## 2017-04-26 NOTE — NURSING NOTE
"Oncology Rooming Note    April 26, 2017 2:23 PM   Matthew Stover is a 54 year old female who presents for: Oncology Clinic Visit    Initial Vitals: BP (!) 136/97  Pulse 100  Temp 97.5  F (36.4  C) (Oral)  Resp 20  Ht 1.651 m (5' 5\")  Wt 102.1 kg (225 lb)  SpO2 96%  BMI 37.44 kg/m2 Estimated body mass index is 37.44 kg/(m^2) as calculated from the following:    Height as of this encounter: 1.651 m (5' 5\").    Weight as of this encounter: 102.1 kg (225 lb). Body surface area is 2.16 meters squared.  No Pain (0) Comment: Data Unavailable   No LMP recorded. Patient has had an ablation.  Allergies reviewed: Yes  Medications reviewed: Yes    Medications: Medication refills not needed today.  Pharmacy name entered into [employer]: [id number] [place], [place] - [address]    Clinical concerns:    8 minutes for nursing intake (face to face time)     ESTEFANÍA TRUONG LPN                "

## 2017-04-26 NOTE — PROGRESS NOTES
[facility] PHYSICIANS  SPECIALIZING IN BREAKTHROUGHS  Radiation Oncology    On Treatment Visit Note      Matthew Stover      Date: 2017   MRN: [id number]   : 1965  Diagnosis: Moderately Differentiated Adenocarcinoma of the Distal Rectum, clinical stage III (T3N2M0)      Reason for Visit:  On Radiation Treatment Visit     Treatment Summary to Date  Treatment Site: Pelvis Current Dose: 4320/5040 cGy Fractions:       Chemotherapy  Chemo concurrent with radx?: Yes  Oncologist: Dr. Lion  Drug Name/Frequency 1: Xeloda    Subjective: Doing well. Bowels and bladder are stable. Notices slight vulvar swelling.    Nursing ROS:   Nutrition Alteration  Diet Type: Patient's Preference  Skin  Skin Reaction: 0 - No changes  Skin Intervention: using Vanicream  Skin Note: patient reports swelling of labia, denies pain or discomfort, denies discharge        Cardiovascular  Respiratory effort: 1 - Normal - without distress  Gastrointestinal  Nausea: 1 - One to two episodes of nausea/24 (taking Compazine and Ativan)  Vomitin - No vomiting (ons)  GI Note: patient reports slight urgency with bowel movements, small liquid bowel movements, taking Compazine and Ativan  Genitourinary  Urinary Status: 0 - Normal  Psychosocial  Mood - Anxiety: 0 - Normal  Mood - Depression: 0 - Normal  Pyschosocial Note: increasing fatigue  Pain Assessment  0-10 Pain Scale: 0    Objective:   Wt 226 lb  BMI 37.61 kg/m2  Gen: Appears well, NAD  Skin: Mild diffuse erythema over treatment field, ctd external hemorrhoids.  Vulva: Minimal edema of vulva,      Labs:  CBC RESULTS:   Recent Labs   Lab Test  17   1539   WBC  3.7*   RBC  4.73   HGB  13.9   HCT  41.2   MCV  87   MCH  29.4   MCHC  33.7   RDW  15.0   PLT  185     ELECTROLYTES:  Recent Labs   Lab Test  17   1539   NA  136   POTASSIUM  4.0   CHLORIDE  104   MG  8.5   CO2  27   BUN  11   CR  0.93   GLC  106*       Assessment:    Tolerating radiation therapy well.   All questions and concerns addressed.    Plan:   1. Continue current therapy.        Mosaiq chart and setup information reviewed  IGRT images reviewed    Medication Review  Med list reviewed with patient?: Yes  Med list printed and given: Offered and declined    Educational Topic Discussed  Additional Instructions: patient has started using vaginal dilator, denies concerns  Education Instructions: follow-up with Dr. Lion on 4/26/2017      Joce Lovelace M.D.  [facility]  Radiation Oncology    [phone] clinic  [phone] pager

## 2017-04-26 NOTE — PATIENT INSTRUCTIONS
Managing radiation side effects after treatment has ended    The side effects of radiation therapy should gradually decrease in 2 to 3 weeks after you have finished radiation.  Some effects take longer to resolve.    Fatigue caused by radiation therapy will decrease and your energy will improve.    Skin reactions:  Skin changes (such as redness or irritation) will slowly begin to get better. Some people may have a permanent change in skin color.  Their skin may be more pink or  tan  than the untreated skin. The skin may be thinner or more fragile than before treatment.  Continue to use a gentle moisturizing lotion for several months.  You should always protect the skin in the area that was treated by using sunscreen of SPF30 or higher.      Other skin care instructions: continue with daily skin cares for the next one month.        For concerns or questions call [facility] [phone]

## 2017-04-28 NOTE — PROGRESS NOTES
[facility] PHYSICIANS  SPECIALIZING IN BREAKTHROUGHS  Radiation Oncology    On Treatment Visit Note      Matthew Stover      Date: 2017   MRN: [id number]   : 1965  Diagnosis: Moderately Differentiated Adenocarcinoma of the Distal Rectum, clinical stage III (T3N2M0)      Reason for Visit:  On Radiation Treatment Visit     Treatment Summary to Date  Treatment Site: Pelvis Current Dose: 5040/5040 cGy Fractions:       Chemotherapy  Chemo concurrent with radx?: Yes  Oncologist: Dr. Lion  Drug Name/Frequency 1: Xeloda    Subjective: Finished radiation today. Doing well.  Bowels and bladder stable.  Feels vulvar swelling better. No other concerns.    Nursing ROS:   Nutrition Alteration  Diet Type: Patient's Preference  Skin  Skin Reaction: 0 - No changes  Skin Intervention: using Vanicream        Cardiovascular  Respiratory effort: 1 - Normal - without distress  Gastrointestinal  Nausea: 1 - One to two episodes of nausea/24 (taking Compazine and Ativan)  Vomitin - No vomiting (ons)  GI Note: patient reports slight urgency with bowel movements, small liquid bowel movements, taking Compazine and Ativan  Genitourinary  Urinary Status: 0 - Normal  Psychosocial  Mood - Anxiety: 0 - Normal  Mood - Depression: 0 - Normal  Pyschosocial Note: increasing fatigue  Pain Assessment  0-10 Pain Scale: 0    Objective:   Wt 225 lb  BMI 37.44 kg/m2  Gen: Appears well, NAD  Skin: Mild diffuse erythema over treatment field    Labs:  CBC RESULTS:   Recent Labs   Lab Test  17   1539   WBC  3.7*   RBC  4.73   HGB  13.9   HCT  41.2   MCV  87   MCH  29.4   MCHC  33.7   RDW  15.0   PLT  185     ELECTROLYTES:  Recent Labs   Lab Test  17   1539   NA  136   POTASSIUM  4.0   CHLORIDE  104   MG  8.5   CO2  27   BUN  11   CR  0.93   GLC  106*       Assessment:    Tolerating radiation therapy well.  All questions and concerns addressed.    Plan:   1. F/u with us in 1 month. Has f/u with Dr. Lion and  colorectal surgery.      Mosaiq chart and setup information reviewed  IGRT images reviewed    Medication Review  Med list reviewed with patient?: Yes  Med list printed and given: Offered and declined    Educational Topic Discussed  Additional Instructions: patient has started using vaginal dilator, denies concerns  Education Instructions: patient has follow-up with surgeon scheduled, scheduled for scans, will follow-up with Marisa Olivarez NP on 5/24/2017      Joce Lovelace M.D.  [facility]  Radiation Oncology    [phone] clinic  [phone] pager

## 2017-05-03 ENCOUNTER — ONCOLOGY VISIT (OUTPATIENT)
Dept: RADIATION ONCOLOGY | Facility: CLINIC | Age: 52
End: 2017-05-03

## 2017-05-09 NOTE — PROCEDURES
Radiotherapy Treatment Summary          Date of Report: May 03, 2017     PATIENT: DAVID QUINTANA  MEDICAL RECORD NO: [id number]  : 1965     DIAGNOSIS: C20 Malignant neoplasm of rectum  INTENT OF RADIOTHERAPY: Cure  PATHOLOGY:  Moderately differentiated adenocarcinoma of the distal rectum.                                   STAGE: Stage III (T3 N2 M0)    CONCURRENT THERAPY:  Xeloda                        Details of the treatments summarized below are found in records kept in the Department of Radiation Oncology at [facility].  Treatment Summary:  Radiation Oncology - Course: 1    Treatment Site      Dose                Modality From To Elapsed Days Fx.  1 Pelvis             4,500 cGy    18 X  3/20/2017  2017  32 25  1 Pelvis_Bst        540 cGy    18 X  4/24/2017  2017   2  3          Dose per Fraction:        Total Dose:        180 cGy     5,040 cGy        COMMENTS:                      Patient has skin reaction with labia swelling and discomfort.  She had mild nausea and increasing fatigue   through treatment.  She had mild diarrhea.     PAIN MANAGEMENT:    She did not require any prescription pain medications through treatment.                            FOLLOW UP PLAN:     Patient will follow up here in 1 month.  She will also schedule follow up in colorectal surgery and has an   appointment with genetics and Dr. Lion in .                         Staff Physician: Joce Lovelace M.D.  Physicist: Aracely Leija MS     CC:   Wisam Lion MD              Radiation Oncology [address] [place] [id number] Phone: [phone]

## 2017-05-10 ENCOUNTER — ONCOLOGY VISIT (OUTPATIENT)
Dept: ONCOLOGY | Facility: CLINIC | Age: 52
End: 2017-05-10
Payer: COMMERCIAL

## 2017-05-10 DIAGNOSIS — C20 MALIGNANT NEOPLASM OF RECTUM (H): Primary | ICD-10-CM

## 2017-05-10 PROCEDURE — 96040 ZZC GENETIC COUNSELING, EACH 30 MIN: CPT | Performed by: GENETIC COUNSELOR, MS

## 2017-05-10 NOTE — MR AVS SNAPSHOT
After Visit Summary   5/10/2017    Matthew Stover    MRN: [id number]           Patient Information     Date Of Birth          1965        Visit Information        Provider Department      5/10/2017 2:15 PM Latosha Purdy [facility]        Today's Diagnoses     Malignant neoplasm of rectum (H)    -  1      Care Instructions      Assessing Cancer Risk  Only about 5-10% of cancers are thought to be due to an inherited cancer susceptibility gene.    These families often have:  ? Several people with the same or related types of cancer  ? Cancers diagnosed at a young age (before age 50)  ? Individuals with more than one primary cancer  ? Multiple generations of the family affected with cancer    Comprehensive Colon Cancer Panel  We each inherit two copies of every gene in our bodies: one from our mother, and one from our father.  Each gene has a specific job to do.  When a gene has a mistake or  mutation  in it, it does not work like it should.      This handout will review common hereditary colon cancer syndromes, and other genes related to an increased risk for colon cancer.  The genes that will be discussed in this handout are: APC, BMPR1A, CDH1, CHEK2, EPCAM, GREM1, MLH1, MSH2, MSH6, MUTYH, PMS2, POLD1, POLE, PTEN, SMAD4, STK11, and TP53.  These genes are clinically actionable, meaning there are published guidelines for cancer screening and management for individuals who are found to carry mutations in these genes. Inheriting a mutation does not mean a person will develop cancer, but it does significantly increase his or her risk above the general population risk.      Familial Adenomatous Polyposis (FAP)  FAP is a hereditary cancer syndrome caused by mutations in the APC gene. The condition is known to cause hundreds to thousands of adenomatous polyps in the colon, creating a  carpet  of polyps. Some individuals have what is called attenuated FAP (AFAP), a milder form of FAP with  fewer polyps and typically later onset. Individuals with an APC gene mutation are at an increased risk for colon, thyroid, and duodenal cancers, as well as several other types of cancer1.  Other features of this condition may include: osteomas, dental anomalies, benign skin lesions, CHRPE ( freckle  on the inside of the eye), and desmoid tumors.      Lifetime Cancer Risks     Cancer Type General Population FAP   Colon  5% near 100%   Thyroid (papillary) 1% 1-12%   Duodenal <1% 5%   Liver  <1% 1-2% before age 5   Pancreas <1% 1%   Stomach <1% 1%       Juvenile Polyposis Syndrome (JPS)  JPS is characterized by hamartomatous polyps, called juvenile polyps, in the gastrointestinal tract.  Juvenile  refers to the type of polyps seen in this hereditary cancer condition, not the age of onset. Currently, mutations in two genes are known to cause JPS: BMPR1A and SMAD4. Of individuals clinically diagnosed with JPS, only 40% have an identifiable mutation in one of these genes, suggesting there are other genes that cause JPS that have not been discovered yet. Individuals with JPS are at an increased risk for colon cancer and stomach cancer 2,3,4. Pancreatic and small bowel cancers have also been reported in JPS, but the actual risks are unknown.         Lifetime Cancer Risks    Cancer Type General Population JPS   Colon 5% 40-50%   Gastric/Duodenal <1% 10-21%     Some individuals with SMAD4 mutations have a condition called JPS/HHT (Juvenile Polyposis/Hereditary Hemorrhagic Telangiectasia) where in addition to JPS, individuals may have nose bleeds and clotting issues.     Hereditary Diffuse Gastric Cancer (HDGC)  Currently, mutations in one gene are known to cause Hereditary Diffuse Gastric Cancer: CDH1.  Individuals with HDGC are at increased risk for diffuse gastric cancer and lobular breast cancer. Of people diagnosed with HDGC, 30-50% have a mutation in the CDH1 gene.  This suggests there are likely mutations in other genes  that may cause HDGC that have not been identified yet. Individuals with HDGC may also be at increased risk for colon cancer.      Lifetime Cancer Risks    Cancer Type General Population HDGC   Diffuse Gastric <1% 67-83%   Breast 12% 39-52%     Drisclol syndrome  Mutations in five different genes are known to cause Driscoll syndrome: MLH1, MSH2, MSH6, PMS2, and EPCAM. Individuals with Driscoll syndrome have an increased risk for colon, uterine, ovarian, small bowel, stomach, urinary tract, and brain cancer, as well as several other types of cancer. The exact lifetime cancer risks are dependent upon the gene in which the mutation was identified.      Lifetime Cancer Risks    Cancer Type General Population Driscoll syndrome   Colon 5% 10-80%   Uterine 2-3% 15-60%   Stomach <1% 6-13%   Ovarian 2% 4-24%   Urinary tract <1% 1-7%   Hepatobiliary tract <1% 1-4%   Small bowel <1% 3-6%   Brain/CNS <1% 1-3%   Pancreas <1% 1-6%       MUTYH-Associated Polyposis (MAP)  MAP is a hereditary cancer syndrome caused by mutations in the MUTYH gene. Unlike the other hereditary cancer genes discussed in this handout, two mutations in the MUTYH gene cause MAP and increase cancer risk. Those affected with MAP typically have between  adenomatous polyps. This syndrome also increases the risk for colon and duodenal cancer. Current research suggests that other cancers may be associated with MUTYH mutations, as well. The table below includes the risk that someone with two MUTYH gene mutations would develop cancer in their lifetime; of note, there is also an increased colon cancer risk for individuals who carry only one MUTYH gene mutation5,6,7.       Lifetime Cancer Risks    Cancer Type General Population MAP   Colon 5% %   Duodenal  <1% 5%       Cowden syndrome  Cowden syndrome is a hereditary condition that increases the risk for breast, thyroid, endometrial, colon, and kidney cancer.  A single mutation in the PTEN gene causes Cowden syndrome  and increases cancer risk.  The table below shows the chance that someone with a PTEN mutation would develop cancer in their lifetime8,9.  Other benign features seen in some individuals with Cowden syndrome include benign skin lesions (facial papules, keratoses, lipomas), learning disabilities, autism, thyroid nodules, hamartomatous colon polyps, and larger head size.      Lifetime Cancer Risks   Cancer Type General Population Cowden Syndrome   Breast 12% 25-50%*   Thyroid 1% 35%   Renal 1-2% 35%   Endometrial 2-3% 28%   Colon 5% 9%   Melanoma 2-3% >5%     *One recent study found breast cancer risk to be increased to 85%    Peutz-Jeghers syndrome (PJS)  PJS is a hereditary cancer syndrome caused by mutations in the STK11 gene. This condition can be distinguished from other hereditary syndromes by the presence of hamartomatous polyps in the gastrointestinal tract and freckles present in unusual places such as the hands, feet, neck, and lips. Individuals with Peutz-Jeghers syndrome have an increased risk for colon, breast, pancreatic, and other cancers3.  Men are at risk for testicular tumors which can affect hormones in the body. Women are at risk for sex cord tumors of the ovaries and a rare aggressive type of cervical cancer.     Lifetime Cancer Risks    Cancer Type General Population PJS   Breast 12% 45-50%   Colon 5% 39%   Stomach <1% 29%   Pancreas 1.5% 11-36%   Small Intestine <1% 13%     Ovarian  2%  18%   Lung 6-7% 15-17%     Additional Genes Associated with Increased Colon Cancer Risk  CHEK2  CHEK2 is a moderate-risk breast cancer gene.  Women who have a mutation in CHEK2 have around a 2-fold increased risk for breast cancer compared to the general population, and this risk may be higher depending upon family history.12,13,14.  Mutations in CHEK2 have also been shown to increase the risk of a number of other cancers, including colon and prostate, however these cancer risks are currently not well understood.      GREM1  GREM1 is a moderate-risk colon polyposis gene. Duplications of this gene are more commonly found in individuals with Ashkenazi Latter day oixutase55. Mutations in GREM1 are associated with colon polyps and therefore an increased risk of colon cancer; however the estimated cancer risk is not well [id number].     POLD1 and POLE  POLD1 and POLE are moderate-risk colon cancer genes. Carriers of a mutation in one of these genes increases the lifetime risk of colorectal [id number],18,19,20. Mutations in these genes may also be associated with increased risk for other cancers including: endometrial cancer, duodenal adenomas and carcinomas, and brain tumors.    TP53  Li Fraumeni syndrome (LFS) is a hereditary cancer predisposition syndrome. LFS is caused by a mutation in the TP53 gene. A single mutation in one of the copies of TP53 increases the risk for multiple cancers. Individuals with LFS are at an increased risk for developing cancer at a young age. The general lifetime risk for development of cancer is 50% by age 30 and 90% by age 60.      Core Cancers: Sarcomas, Breast, Brain, Lung, Leukemias/Lymphomas, Adrenocortical carcinomas  Other Cancers: Gastrointestinal, Thyroid, Skin, Genitourinary    Genetic Testing  Genetic testing involves a simple blood test and will look at the genetic information in genes associated with an increased risk of colon cancer. The tests look for any harmful mutations that are associated with increased cancer risk.  If possible, it is recommended that the person(s) who has had cancer be tested before other family members.  That person will give us the most useful information about whether or not a specific gene mutation is associated with the cancer in the family.     Results  There are three possible results from genetic testing:  ? Positive--a harmful mutation was identified  ? Negative--no mutation was identified  ? Variant of unknown significance--a variation in one of the genes  was identified, but it is unclear how this impacts cancer risk in the family  Advantages and Disadvantages  There are advantages and disadvantages to genetic testing of these genes.    Advantages  ? May clarify your cancer risk  ? Can help you make medical decisions  ? May explain the cancers in your family  ? May give useful information to your family members (if you share your results)    Disadvantages  ? Possible negative emotional impact of learning about inherited cancer risk  ? Uncertainty in interpreting a negative test result in some situations  ? Possible genetic discrimination concerns (see below)    Inheritance   Most mutations in the genes outlined above are inherited in an autosomal dominant pattern.  This means that if a parent has a mutation, each of his or her children will have a 50% chance of inheriting that same mutation.  Therefore, each child--male or female--would have a 50% chance of being at increased risk for developing cancer.                                            Image obtained from [employer], 2013     In the case of MUTYH-Associated Polyposis (MAP) this hereditary cancer syndrome is inherited in an autosomal recessive pattern. This means that each parent of an individual with MAP is a carrier of MAP, meaning that they have only one mutation in MUTYH. They still have one functioning copy of their gene.  Carriers are at a slightly higher risk for colon cancer than the general population. If each parent is a carrier for MAP, they have a 25% of having a child who is affected with MAP, meaning the child inherited both gene mutations - one from each parent.       Image obtained from [employer], 2016    Genetic Information Nondiscrimination Act (CHUCHO)  [place] is a federal law that protects individuals from health insurance or employment discrimination based on a genetic test result alone.  Although rare, there are currently no legal protections in terms of life  insurance, long term care, or disability insurances.  Visit the National Human Genome Research [place] at Genome.gov/86975754 to learn more.    Reducing Cancer Risk  Each of the genes listed within this handout have nationally recognized cancer screening guidelines that would be recommended for individuals who test positive.  In addition to increased cancer screening, surgeries may be offered or recommended to reduce cancer risk in certain cases.  Recommendations are based upon an individual s genetic test result as well as their personal and family history of cancer.    Questions to Think About Regarding Genetic Testing  ? What effect will the test result have on me and my relationship with my family members if I have an inherited gene mutation?  If I don t have a gene mutation?  ? Should I share my test results, and how will my family react to this news, which may also affect them?  ? Are my children ready to learn new information that may one day affect their own health?    Resources    PTEN World PTENworld.ITM Power   No Stomach for Cancer, Inc. nostomachforcancer.org   Stomach Cancer Relief Network scrnet.org   Collaborative Group of the Americas on Inherited Colorectal Cancer (CGA) cgaicc.com   Cancer Care cancercare.org   American Cancer Society (ACS) cancer.org   National Cancer [place] (NCI) cancer.org   Driscoll Syndrome International [email or web address].[email or web address]       Please call us if you have any questions or concerns.     Cancer Risk Management Program [phone] ([phone])  ? Anamaria Stephanie, MS, [facility]  [phone]  ? Debo Marks, MS, [facility]  [phone]  ? Mariah Leos, MS, [facility]  [phone]  ? Latosha Purdy, MS, [facility]  [phone]  ? Raghav Arora, MS, [facility]  [phone]    References    1. Torrey MIRZA, Bety J, Jesse G, River E, Aileen J, et al. The Prevalence of thyroid cancer and benign thyroid disease in patients with familial adenomatous polyposis may be higher than previously recognized. Clin Colorectal  Cancer. 2012;11:304-308.  2. Yelena L, Danny Abdullahi A, John L, Pretty ELKINS, Ciro K, et al. Risk of colorectal cancer in juvenile polyposis. Gut. 2007;56:965-967.  3. Clint FG, Kavon MN, Luis E CA. Colorectal cancer risk in hamartomatous polyposis syndromes. World Journal of Gastrointestinal Surgery. 2015;27:25-32  4. Parrish MG. Guidance on gastrointestinal surveillance for hereditary non-polyposis colorectal cancer, familial adenomatous polypolis, juvenile polyposis, and Peutz-Jeghers syndrome. Gut. 2002;51:21-27.  5. Khoi AK, Akira MARTHA, Eric JG et al. Risk of extracolonic cancers for people with biallelic and monoallelic mutations in MUTYH. Int J of Cancer. 2016;139:4235-9359.  6. Radha S, Ramos S, Godane H, Unique K, Martinez M, et al. MUTYH-associated polyposis: 70 of 71 patients with biallelic mutations present with an attenuated or atypical phenotype. Int J of Cancer. 2006;119:807-814.  7. Dawson G, Peggy F, Yasmani I, Pingriceldav M, Haven H, et al. MUTYH mutation carriers have increased breast cancer risk. Cancer. 2012;4286-7513.  8. Rory MH, Rita J, Rose J, Ryeladia LA, Orosbaldo MS, Eng C. Lifetime cancer risks in individuals with germline PTEN mutations. Clin Cancer Res. 2012;18:400-7.  9. Venu MARTINEZ. Cowden Syndrome: A Critical Review of the Clinical Literature. J Danielle . 2009:18:13-27.  10. National Comprehensive Cancer Network. Clinical practice guidelines in oncology, colorectal cancer screening. Available online (registration required). 2013.  11. National Cancer [place]. SEER Cancer Stat Fact Sheets.  December 2013.  12. CHEK2 Breast Cancer Case-Control Consortium. CHEK2*1100delC and susceptibility to breast cancer: A collaborative analysis involving 10,860 breast cancer cases and 9,065 controls from 10 studies. Am J Hum Danielle, 74 (2004), pp. 1130-8994  13. Padmini STAPLES Casadei S, Eduardo K, et al. Spectrum of Mutations in BRCA1, BRCA2, CHEK2, and TP53 in Families at High Risk of  Breast Cancer. PATRICIO. 2006;295(12):6729-5681.  14. Nicole ELKINS, Danielle D, Oliverio SPEARS, et al. Risk of breast cancer in women with a CHEK2 mutation with and without a family history of breast cancer. J Clin Oncol. 2011;29:9575-7258.  15. Kimberly NG, Gio E, Kedar J, Griselda N, Erich SIMENTAL et al. Defining the polyposis/colorectal cancer phenotype associated with the GREM1 duplication: counseling and management guidelines. Danielle .Res. 2016;98:1-5.  16. Cristal E, Fay S, Jluis A, Ame Thomas, et al. Hereditary mixed polyposis syndrome is caused by a 40kb upstream duplication that leads to increased and ectopic expression of the BMP antagonist GREM1. Gerda Danielle. 2015;44:699-703.  17. BRITTNI Almanzar. et al. Germline mutations affecting the proofreading domains of POLE and POLD1 predispose to colorectal adenomas and carcinomas. Gerda. Danielle. 45, 136-44 (2013).  18. BEENA Wetzel. et al. POLE and POLD1 mutations in 529 keagan with familial colorectal cancer and/or polyposis: review of reported cases and recommendations for genetic testing and surveillance. Danielle. Med. (2015). doi:10.1038/gim.2015.75  19. AZAEL Mckeon et al. New insights into POLE and POLD1 germline mutations in familial colorectal cancer and polyposis. Hum. Mol. Danielle. 23, 4676-12 (2014).  20. REBECCA Recio. et al. Frequency and phenotypic spectrum of germline mutations in POLE and seven other polymerase genes in 266 patients with colorectal adenomas and carcinomas. Int. J. Cancer 137, 320-31 (2015).           Follow-ups after your visit        Your next 10 appointments already scheduled     May 24, 2017  9:00 AM CDT   Return Visit with NOELLE Childs CNP   [facility])    [address] [place] [id number]   [phone]            Jun 20, 2017 12:15 PM CDT   Return Visit with Wisam Lion MD   [facility])    [address] [place] [address] [place]  [id number] [phone]              Who to contact     If you have questions or need follow up information about today's clinic visit or your schedule please contact [facility] directly at [phone].  Normal or non-critical lab and imaging results will be communicated to you by [employer], letter or phone within 4 business days after the clinic has received the results. If you do not hear from us within 7 days, please contact the clinic through [employer] or phone. If you have a critical or abnormal lab result, we will notify you by phone as soon as possible.  Submit refill requests through [employer] or call your pharmacy and they will forward the refill request to us. Please allow 3 business days for your refill to be completed.          Additional Information About Your Visit        [employer] Information     [employer] gives you secure access to your electronic health record. If you see a primary care provider, you can also send messages to your care team and make appointments. If you have questions, please call your primary care clinic.  If you do not have a primary care provider, please call [phone] and they will assist you.      [employer] is an electronic gateway that provides easy, online access to your medical records. With [employer], you can request a clinic appointment, read your test results, renew a prescription or communicate with your care team.     To access your existing account, please contact your [facility] or call [phone] for assistance.        Care EveryWhere ID     This is your Care EveryWhere ID. This could be used by other organizations to access your [facility] medical records  [id number]         Blood Pressure from Last 3 Encounters:   04/26/17 (!) 136/97   04/19/17 (!) 143/94   04/12/17 134/84    Weight from Last 3 Encounters:   04/26/17 102.1 kg (225 lb)   04/26/17 102.1 kg (225 lb)   04/20/17 102.5 kg (226 lb)              Today, you had the  following     No orders found for display       Primary Care Provider Office Phone # Fax #    Mal Ladd [phone]       [address] [address]        Thank you!     Thank you for choosing [facility]  for your care. Our goal is always to provide you with excellent care. Hearing back from our patients is one way we can continue to improve our services. Please take a few minutes to complete the written survey that you may receive in the mail after your visit with us. Thank you!             Your Updated Medication List - Protect others around you: Learn how to safely use, store and throw away your medicines at www.disposemymeds.org.          This list is accurate as of: 5/10/17  3:05 PM.  Always use your most recent med list.                   Brand Name Dispense Instructions for use    capecitabine 500 MG tablet CHEMO    XELODA    196 tablet    Take 4 tablets (2000mg) in the AM and 3 tablets (1500mg) in the PM. Take for 5 days per week Monday-Friday. Do NOT take on Saturday-Sunday. Take with water within 30 minutes after meal.       LORazepam 0.5 MG tablet    ATIVAN    30 tablet    Take 1 tablet (0.5 mg) by mouth every 4 hours as needed (Anxiety, Nausea/Vomiting or Sleep)       prochlorperazine 10 MG tablet    COMPAZINE    30 tablet    Take 1 tablet (10 mg) by mouth every 6 hours as needed (Nausea/Vomiting)

## 2017-05-10 NOTE — PATIENT INSTRUCTIONS
Assessing Cancer Risk  Only about 5-10% of cancers are thought to be due to an inherited cancer susceptibility gene.    These families often have:  ? Several people with the same or related types of cancer  ? Cancers diagnosed at a young age (before age 50)  ? Individuals with more than one primary cancer  ? Multiple generations of the family affected with cancer    Comprehensive Colon Cancer Panel  We each inherit two copies of every gene in our bodies: one from our mother, and one from our father.  Each gene has a specific job to do.  When a gene has a mistake or  mutation  in it, it does not work like it should.      This handout will review common hereditary colon cancer syndromes, and other genes related to an increased risk for colon cancer.  The genes that will be discussed in this handout are: APC, BMPR1A, CDH1, CHEK2, EPCAM, GREM1, MLH1, MSH2, MSH6, MUTYH, PMS2, POLD1, POLE, PTEN, SMAD4, STK11, and TP53.  These genes are clinically actionable, meaning there are published guidelines for cancer screening and management for individuals who are found to carry mutations in these genes. Inheriting a mutation does not mean a person will develop cancer, but it does significantly increase his or her risk above the general population risk.      Familial Adenomatous Polyposis (FAP)  FAP is a hereditary cancer syndrome caused by mutations in the APC gene. The condition is known to cause hundreds to thousands of adenomatous polyps in the colon, creating a  carpet  of polyps. Some individuals have what is called attenuated FAP (AFAP), a milder form of FAP with fewer polyps and typically later onset. Individuals with an APC gene mutation are at an increased risk for colon, thyroid, and duodenal cancers, as well as several other types of cancer1.  Other features of this condition may include: osteomas, dental anomalies, benign skin lesions, CHRPE ( freckle  on the inside of the eye), and desmoid tumors.      Lifetime  Cancer Risks     Cancer Type General Population FAP   Colon  5% near 100%   Thyroid (papillary) 1% 1-12%   Duodenal <1% 5%   Liver  <1% 1-2% before age 5   Pancreas <1% 1%   Stomach <1% 1%       Juvenile Polyposis Syndrome (JPS)  JPS is characterized by hamartomatous polyps, called juvenile polyps, in the gastrointestinal tract.  Juvenile  refers to the type of polyps seen in this hereditary cancer condition, not the age of onset. Currently, mutations in two genes are known to cause JPS: BMPR1A and SMAD4. Of individuals clinically diagnosed with JPS, only 40% have an identifiable mutation in one of these genes, suggesting there are other genes that cause JPS that have not been discovered yet. Individuals with JPS are at an increased risk for colon cancer and stomach cancer 2,3,4. Pancreatic and small bowel cancers have also been reported in JPS, but the actual risks are unknown.         Lifetime Cancer Risks    Cancer Type General Population JPS   Colon 5% 40-50%   Gastric/Duodenal <1% 10-21%     Some individuals with SMAD4 mutations have a condition called JPS/HHT (Juvenile Polyposis/Hereditary Hemorrhagic Telangiectasia) where in addition to JPS, individuals may have nose bleeds and clotting issues.     Hereditary Diffuse Gastric Cancer (HDGC)  Currently, mutations in one gene are known to cause Hereditary Diffuse Gastric Cancer: CDH1.  Individuals with HDGC are at increased risk for diffuse gastric cancer and lobular breast cancer. Of people diagnosed with HDGC, 30-50% have a mutation in the CDH1 gene.  This suggests there are likely mutations in other genes that may cause HDGC that have not been identified yet. Individuals with HDGC may also be at increased risk for colon cancer.      Lifetime Cancer Risks    Cancer Type General Population HDGC   Diffuse Gastric <1% 67-83%   Breast 12% 39-52%     Driscoll syndrome  Mutations in five different genes are known to cause Driscoll syndrome: MLH1, MSH2, MSH6, PMS2, and  EPCAM. Individuals with Driscoll syndrome have an increased risk for colon, uterine, ovarian, small bowel, stomach, urinary tract, and brain cancer, as well as several other types of cancer. The exact lifetime cancer risks are dependent upon the gene in which the mutation was identified.      Lifetime Cancer Risks    Cancer Type General Population Driscoll syndrome   Colon 5% 10-80%   Uterine 2-3% 15-60%   Stomach <1% 6-13%   Ovarian 2% 4-24%   Urinary tract <1% 1-7%   Hepatobiliary tract <1% 1-4%   Small bowel <1% 3-6%   Brain/CNS <1% 1-3%   Pancreas <1% 1-6%       MUTYH-Associated Polyposis (MAP)  MAP is a hereditary cancer syndrome caused by mutations in the MUTYH gene. Unlike the other hereditary cancer genes discussed in this handout, two mutations in the MUTYH gene cause MAP and increase cancer risk. Those affected with MAP typically have between  adenomatous polyps. This syndrome also increases the risk for colon and duodenal cancer. Current research suggests that other cancers may be associated with MUTYH mutations, as well. The table below includes the risk that someone with two MUTYH gene mutations would develop cancer in their lifetime; of note, there is also an increased colon cancer risk for individuals who carry only one MUTYH gene mutation5,6,7.       Lifetime Cancer Risks    Cancer Type General Population MAP   Colon 5% %   Duodenal  <1% 5%       Cowden syndrome  Cowden syndrome is a hereditary condition that increases the risk for breast, thyroid, endometrial, colon, and kidney cancer.  A single mutation in the PTEN gene causes Cowden syndrome and increases cancer risk.  The table below shows the chance that someone with a PTEN mutation would develop cancer in their lifetime8,9.  Other benign features seen in some individuals with Cowden syndrome include benign skin lesions (facial papules, keratoses, lipomas), learning disabilities, autism, thyroid nodules, hamartomatous colon polyps, and  larger head size.      Lifetime Cancer Risks   Cancer Type General Population Cowden Syndrome   Breast 12% 25-50%*   Thyroid 1% 35%   Renal 1-2% 35%   Endometrial 2-3% 28%   Colon 5% 9%   Melanoma 2-3% >5%     *One recent study found breast cancer risk to be increased to 85%    Peutz-Jeghers syndrome (PJS)  PJS is a hereditary cancer syndrome caused by mutations in the STK11 gene. This condition can be distinguished from other hereditary syndromes by the presence of hamartomatous polyps in the gastrointestinal tract and freckles present in unusual places such as the hands, feet, neck, and lips. Individuals with Peutz-Jeghers syndrome have an increased risk for colon, breast, pancreatic, and other cancers3.  Men are at risk for testicular tumors which can affect hormones in the body. Women are at risk for sex cord tumors of the ovaries and a rare aggressive type of cervical cancer.     Lifetime Cancer Risks    Cancer Type General Population PJS   Breast 12% 45-50%   Colon 5% 39%   Stomach <1% 29%   Pancreas 1.5% 11-36%   Small Intestine <1% 13%     Ovarian  2%  18%   Lung 6-7% 15-17%     Additional Genes Associated with Increased Colon Cancer Risk  CHEK2  CHEK2 is a moderate-risk breast cancer gene.  Women who have a mutation in CHEK2 have around a 2-fold increased risk for breast cancer compared to the general population, and this risk may be higher depending upon family history.12,13,14.  Mutations in CHEK2 have also been shown to increase the risk of a number of other cancers, including colon and prostate, however these cancer risks are currently not well understood.     GREM1  GREM1 is a moderate-risk colon polyposis gene. Duplications of this gene are more commonly found in individuals with [employer]azi [employer] [id number]. Mutations in GREM1 are associated with colon polyps and therefore an increased risk of colon cancer; however the estimated cancer risk is not well [id number].     POLD1 and POLE  POLD1 and POLE  are moderate-risk colon cancer genes. Carriers of a mutation in one of these genes increases the lifetime risk of colorectal sacxkc80,18,19,20. Mutations in these genes may also be associated with increased risk for other cancers including: endometrial cancer, duodenal adenomas and carcinomas, and brain tumors.    TP53  Li Fraumeni syndrome (LFS) is a hereditary cancer predisposition syndrome. LFS is caused by a mutation in the TP53 gene. A single mutation in one of the copies of TP53 increases the risk for multiple cancers. Individuals with LFS are at an increased risk for developing cancer at a young age. The general lifetime risk for development of cancer is 50% by age 30 and 90% by age 60.      Core Cancers: Sarcomas, Breast, Brain, Lung, Leukemias/Lymphomas, Adrenocortical carcinomas  Other Cancers: Gastrointestinal, Thyroid, Skin, Genitourinary    Genetic Testing  Genetic testing involves a simple blood test and will look at the genetic information in genes associated with an increased risk of colon cancer. The tests look for any harmful mutations that are associated with increased cancer risk.  If possible, it is recommended that the person(s) who has had cancer be tested before other family members.  That person will give us the most useful information about whether or not a specific gene mutation is associated with the cancer in the family.     Results  There are three possible results from genetic testing:  ? Positive--a harmful mutation was identified  ? Negative--no mutation was identified  ? Variant of unknown significance--a variation in one of the genes was identified, but it is unclear how this impacts cancer risk in the family  Advantages and Disadvantages  There are advantages and disadvantages to genetic testing of these genes.    Advantages  ? May clarify your cancer risk  ? Can help you make medical decisions  ? May explain the cancers in your family  ? May give useful information to your family  members (if you share your results)    Disadvantages  ? Possible negative emotional impact of learning about inherited cancer risk  ? Uncertainty in interpreting a negative test result in some situations  ? Possible genetic discrimination concerns (see below)    Inheritance   Most mutations in the genes outlined above are inherited in an autosomal dominant pattern.  This means that if a parent has a mutation, each of his or her children will have a 50% chance of inheriting that same mutation.  Therefore, each child--male or female--would have a 50% chance of being at increased risk for developing cancer.                                            Image obtained from [employer], 2013     In the case of MUTYH-Associated Polyposis (MAP) this hereditary cancer syndrome is inherited in an autosomal recessive pattern. This means that each parent of an individual with MAP is a carrier of MAP, meaning that they have only one mutation in MUTYH. They still have one functioning copy of their gene.  Carriers are at a slightly higher risk for colon cancer than the general population. If each parent is a carrier for MAP, they have a 25% of having a child who is affected with MAP, meaning the child inherited both gene mutations - one from each parent.       Image obtained from [employer], 2016    Genetic Information Nondiscrimination Act (CHUCHO)  [place] is a federal law that protects individuals from health insurance or employment discrimination based on a genetic test result alone.  Although rare, there are currently no legal protections in terms of life insurance, long term care, or disability insurances.  Visit the National Human Genome Research [place] at Genome.gov/82527774 to learn more.    Reducing Cancer Risk  Each of the genes listed within this handout have nationally recognized cancer screening guidelines that would be recommended for individuals who test positive.  In addition to increased  cancer screening, surgeries may be offered or recommended to reduce cancer risk in certain cases.  Recommendations are based upon an individual s genetic test result as well as their personal and family history of cancer.    Questions to Think About Regarding Genetic Testing  ? What effect will the test result have on me and my relationship with my family members if I have an inherited gene mutation?  If I don t have a gene mutation?  ? Should I share my test results, and how will my family react to this news, which may also affect them?  ? Are my children ready to learn new information that may one day affect their own health?    Resources    PTEN World [email or web address]   No Stomach for Cancer, Inc. [email or web address]   Stomach Cancer Relief Network scrnet.[email or web address]   Collaborative Group of the Americas on Inherited Colorectal Cancer (CGA) cgaicc.com   Cancer Care cancercare.[email or web address]   American Cancer Society (ACS) cancer.org   National Cancer [place] (NCI) cancer.org   Driscoll Syndrome International [email or web address]       Please call us if you have any questions or concerns.     Cancer Risk Management Program [phone]-CANCER ([phone])  ? Anamaria Stephanie, MS, [facility]  [phone]  ? Debo Selina, MS, [facility]  [phone]  ? Mariah Leos, MS, [facility]  [phone]  ? Latosha Purdy, MS, [facility]  [phone]  ? Kennethon Ulysses, MS, [facility]  [phone]    References    1. Torrey MIRZA, Bety J, Molina G, Bryce-Eloisa E, Aileen J, et al. The Prevalence of thyroid cancer and benign thyroid disease in patients with familial adenomatous polyposis may be higher than previously recognized. Clin Colorectal Cancer. 2012;11:304-308.  2. Pedros L, Van Kaylen A, John L, Pretty C, Ciro K, et al. Risk of colorectal cancer in juvenile polyposis. Gut. 2007;56:965-967.  3. Clint FG, Kavon MN, Luis E CA. Colorectal cancer risk in hamartomatous polyposis syndromes. World Journal of Gastrointestinal Surgery. 2015;27:25-32  4. Parrish POWELL.  Guidance on gastrointestinal surveillance for hereditary non-polyposis colorectal cancer, familial adenomatous polypolis, juvenile polyposis, and Peutz-Jeghers syndrome. Gut. 2002;51:21-27.  5. Khoi AK, Akira MARTHA, Eric JG et al. Risk of extracolonic cancers for people with biallelic and monoallelic mutations in MUTYH. Int J of Cancer. 2016;139:2763-3858.  6. Radha S, Ramos S, Kathy H, Unique K, Martinez M, et al. MUTYH-associated polyposis: 70 of 71 patients with biallelic mutations present with an attenuated or atypical phenotype. Int J of Cancer. 2006;119:807-814.  7. Dawson G, Peggy F, Yasmani I, Junior M, Dawson H, et al. MUTYH mutation carriers have increased breast cancer risk. Cancer. 2012;2107-9408.  8. Valadez MH, Rita J, Rose J, Bry LA, Denise MS, Eng C. Lifetime cancer risks in individuals with germline PTEN mutations. Clin Cancer Res. 2012;18:400-7.  9. Venu R. Cowden Syndrome: A Critical Review of the Clinical Literature. J Danielle . 2009:18:13-27.  10. National Comprehensive Cancer Network. Clinical practice guidelines in oncology, colorectal cancer screening. Available online (registration required). 2013.  11. National Cancer [place]. SEER Cancer Stat Fact Sheets.  December 2013.  12. CHEK2 Breast Cancer Case-Control Consortium. CHEK2*1100delC and susceptibility to breast cancer: A collaborative analysis involving 10,860 breast cancer cases and 9,065 controls from 10 studies. Am J Hum Danielle, 74 (2004), pp. 3613-2843  13. Padmini T, Derek S, Eduardo K, et al. Spectrum of Mutations in BRCA1, BRCA2, CHEK2, and TP53 in Families at High Risk of Breast Cancer. PATRICIO. 2006;295(12):8346-3576.  14. Nicole ELKINS, Danielle D, Oliverio A, et al. Risk of breast cancer in women with a CHEK2 mutation with and without a family history of breast cancer. J Clin Oncol. 2011;29:0328-8221.  15. Kimberly NG, Gio MIRZA, Kedar J, Griselda N, Erich SIMENTAL et al. Defining the polyposis/colorectal cancer phenotype  associated with the GREM1 duplication: counseling and management guidelines. Danielle .Res. 2016;98:1-5.  16. Cristal MIRZA, Fay S, Jluis A, Ame Thomas, et al. Hereditary mixed polyposis syndrome is caused by a 40kb upstream duplication that leads to increased and ectopic expression of the BMP antagonist GREM1. Gerda Danielle. 2015;44:699-703.  17. BRITTNI Almanzar. et al. Germline mutations affecting the proofreading domains of POLE and POLD1 predispose to colorectal adenomas and carcinomas. Gerda. Danielle. 45, 136-44 (2013).  18. BEENA Wetzel. et al. POLE and POLD1 mutations in 529 keagan with familial colorectal cancer and/or polyposis: review of reported cases and recommendations for genetic testing and surveillance. Danielle. Med. (2015). doi:10.1038/gim.2015.75  19. AZAEL Mckeon et al. New insights into POLE and POLD1 germline mutations in familial colorectal cancer and polyposis. Hum. Mol. Danielle. 23, 0904-12 (2014).  20. REBECCA Recio. et al. Frequency and phenotypic spectrum of germline mutations in POLE and seven other polymerase genes in 266 patients with colorectal adenomas and carcinomas. Int. J. Cancer 137, 320-31 (2015).

## 2017-05-10 NOTE — LETTER
Cancer Risk Management  Program Locations    [facility]  [facility]  Mailing Address  Cancer Risk Management Program  [facility]  [address]  [place] [address]    New patient appointments  [phone]  May 12, 2017    Matthew Stover  [address] [address]      Dear Matthew,    It was a pleasure meeting with you at the [facility] on 5/10/2017.  Here is a copy of the progress note from your recent genetic counseling visit to the Cancer Risk Management Program.  If you have any additional questions, please feel free to call.    Referring Provider: Wisam Lion MD; NOELLE Franco CNP    Presenting Information:   I met with Matthew Stover today for genetic counseling at the Cancer Risk Management Program at the [facility] to discuss her personal history of rectal cancer.  She is here today to review this history and available genetic testing options.    Personal History:  Matthew is a 51 year old female.  She was diagnosed with rectal cancer (moderately differentiated adenocarcinoma) at age 51; treatment has included radiation and chemotherapy.  IHC testing of Matthew's rectal cancer was normal: all four mismatch repair proteins (MLH1, MSH2, MSH6 and PMS2) were present.       She had her first menstrual period at age 12, her first child at age 33, and stopped having regular periods at age 49 following endometrial ablation.  Matthew has her ovaries, fallopian tubes and uterus in place, and she has had no ovarian cancer screening to date.       Her most recent OB-GYN exam and Pap smear approximately 2 years ago were reportedly normal.  She has annual clinical breast exams and mammograms, and reports a history of 3 prior breast biopsies (right breast fibroadenoma in 2016, and two  benign left breast biopsies approximately 5 years ago and 10 years ago).  She has a history of approximately four tubular adenomas throught the descending colon,cecum, and sigmoid colon (identified during her colonoscopy from February 2017).  She reports seeing a dermatologist every few years, and reported one benign skin biopsy.   Matthew is a non-smoker.      Family History: (Please see scanned pedigree for detailed family history information)    Matthew's mother was diagnosed with cervical cancer in her 20's and is now 68    She reported that one maternal aunt was diagnosed with cancer at age 18 which required surgical removal of her arm.  She is now 58.  Additional details regarding her cancer history were not available today.     Matthew's reported paternal family history is not significant for any cancer    Her maternal ethnicity is Nigerian. Her paternal ethnicity is Nigerian/Northern .  There is no known Ashkenazi Orthodox ancestry on either side of her family.     Discussion:    Matthew's personal history of early onset rectal cancer may be suggestive of a cancer susceptibility gene.    We reviewed the features of sporadic, familial, and hereditary cancers.  We discussed the Matthew's family history does not present with features consistent with a currently identifiable hereditary cancer syndrome (such as relatives in several generations diagnosed with same or similar cancers at younger ages).  With that being said, Matthew has a personal history significant for early onset rectal cancer.        We discussed the natural history and genetics of several colon cancer susceptibility genes, including Driscoll Syndrome. A detailed handout regarding these genes and the information we discussed was provided to Matthew at the end of our appointment today and can be found in the after visit summary.  Topics included: inheritance pattern, cancer risks, cancer screening recommendations, and also risks, benefits and limitations of  testing.    We reviewed that IHC testing of Matthew's rectal tumor was normal.  Immunohistochemistry (IHC) testing of a tumor allows us to screen a patient for Driscoll syndrome and is ideally done on a colon or endometrial tumor.  Each mismatch repair gene (MLH1, MSH2, MSH6, PMS2) makes a protein.  IHC testing involves looking at the tumor to determine which of the proteins is present and which (if any) are absent.    If one or more of the mismatch repair proteins is absent in the tumor, it can indicate an underlying inherited mutation in the respective gene.  In this way, IHC testing can help determine who is a candidate for additional genetic blood testing for Driscoll syndrome.  Furthermore, IHC testing guides which genetic test is ordered.      Given this normal IHC testing, it is very unlikely that Matthew's rectal cancer was due to a mutation in one of the mismatch repair genes.  A diagnosis of Driscoll syndrome is unlikely.  We discussed several different interpretations of this normal test result.    There is a small possibility of a false negative result.  That is, there could be a mutation in one of these genes that does not affect the IHC analysis.    Another explanation may be that there is a different gene or combination of genes and environment that are associated with the Matthew's rectal cancer.        We discussed that genetic testing is available for several genes associated with an increased risk for colon cancer: ColoNext (APC, BMPR1A, CDH1, CHEK2, GREM1, EPCAM, MLH1, MSH2, MSH6, MUTYH, PMS2, POLD1, POLE, PTEN, SMAD4, STK11, and TP53).  We discussed that some of the genes in the ColoNext panel are associated with specific hereditary cancer syndromes and have published management guidelines::  Driscoll syndrome (MLH1, MSH2, MSH6, PMS2, EPCAM), Familial Adenomatous Polyposis (APC), MUTYH Associated Polyposis (MUTYH), Juvenile Polyposis syndrome (SMAD4, BMPR1A), Peutz-Jeghers syndrome (STK11), Cowden syndrome (PTEN),  Li Fraumeni syndrome (TP53), Hereditary Diffuse Gastric Cancer (CDH1).   The CHEK2, GREM1, POLD1, and POLE genes have also been associated with increased colon cancer risk and have published management guidelines.  Matthew was provided with a detailed brochure from [employer] explaining the ColoNext testing.    Matthew elected to first pursue a benefits analysis through [employer] to better understand her out of pocket cost for the ColoNext gene panel, prior to having her blood drawn.    Medical Management: The information from genetic testing may determine additional cancer screening recommendations as well as options for risk reducing surgeries (such as surgery to remove the ovaries and/or uterus) for Matthew and her relatives.  These recommendations will be discussed in detail once genetic testing is completed.    Plan:  1) Today Matthew elected to pursue a benefits analysis through [employer] to better understand her out of pocket cost for the ColoNext gene panel.  2) Matthew will be contacted with the expected out of pocket cost when available.  3) If Matthew is comfortable with this estimated out of pocket cost, she will return to clinic to sign the consent form and have her blood drawn.    Latosha Purdy MS, [facility]  Certified Genetic Counselor  [phone]

## 2017-05-12 NOTE — PROGRESS NOTES
5/10/2017    Referring Provider: Wisam Lion MD; NOELLE Franco CNP    Presenting Information:   I met with Matthew Stover today for genetic counseling at the Cancer Risk Management Program at the [facility] to discuss her personal history of rectal cancer.  She is here today to review this history and available genetic testing options.    Personal History:  Matthew is a 51 year old female.  She was diagnosed with rectal cancer (moderately differentiated adenocarcinoma) at age 51; treatment has included radiation and chemotherapy.  IHC testing of [place] rectal cancer was normal: all four mismatch repair proteins (MLH1, MSH2, MSH6 and PMS2) were present.       She had her first menstrual period at age 12, her first child at age 33, and stopped having regular periods at age 49 following endometrial ablation.  Matthew has her ovaries, fallopian tubes and uterus in place, and she has had no ovarian cancer screening to date.       Her most recent OB-GYN exam and Pap smear approximately 2 years ago were reportedly normal.  She has annual clinical breast exams and mammograms, and reports a history of 3 prior breast biopsies (right breast fibroadenoma in 2016, and two benign left breast biopsies approximately 5 years ago and 10 years ago).  She has a history of approximately four tubular adenomas throught the descending colon,cecum, and sigmoid colon (identified during her colonoscopy from February 2017).  She reports seeing a dermatologist every few years, and reported one benign skin biopsy.   Matthew is a non-smoker.      Family History: (Please see scanned pedigree for detailed family history information)    Matthew's mother was diagnosed with cervical cancer in her 20's and is now 68    She reported that one maternal aunt was diagnosed with cancer at age 18 which required surgical removal of her arm.  She is now 58.  Additional details regarding her cancer history were not available today.     Matthew's reported  paternal family history is not significant for any cancer    Her maternal ethnicity is Q[place]ari. Her paternal ethnicity is Qatari/Northern .  There is no known Ashkenazi Zoroastrianism ancestry on either side of her family.     Discussion:    Matthew's personal history of early onset rectal cancer may be suggestive of a cancer susceptibility gene.    We reviewed the features of sporadic, familial, and hereditary cancers.  We discussed the Matthew's family history does not present with features consistent with a currently identifiable hereditary cancer syndrome (such as relatives in several generations diagnosed with same or similar cancers at younger ages).  With that being said, Matthew has a personal history significant for early onset rectal cancer.        We discussed the natural history and genetics of several colon cancer susceptibility genes, including Driscoll Syndrome. A detailed handout regarding these genes and the information we discussed was provided to Matthew at the end of our appointment today and can be found in the after visit summary.  Topics included: inheritance pattern, cancer risks, cancer screening recommendations, and also risks, benefits and limitations of testing.    We reviewed that IHC testing of Matthew's rectal tumor was normal.  Immunohistochemistry (IHC) testing of a tumor allows us to screen a patient for Driscoll syndrome and is ideally done on a colon or endometrial tumor.  Each mismatch repair gene (MLH1, MSH2, MSH6, PMS2) makes a protein.  IHC testing involves looking at the tumor to determine which of the proteins is present and which (if any) are absent.    If one or more of the mismatch repair proteins is absent in the tumor, it can indicate an underlying inherited mutation in the respective gene.  In this way, IHC testing can help determine who is a candidate for additional genetic blood testing for Driscoll syndrome.  Furthermore, IHC testing guides which genetic test is ordered.      Given this  normal IHC testing, it is very unlikely that Matthew's rectal cancer was due to a mutation in one of the mismatch repair genes.  A diagnosis of Driscoll syndrome is unlikely.  We discussed several different interpretations of this normal test result.    There is a small possibility of a false negative result.  That is, there could be a mutation in one of these genes that does not affect the IHC analysis.    Another explanation may be that there is a different gene or combination of genes and environment that are associated with the Matthew's rectal cancer.        We discussed that genetic testing is available for several genes associated with an increased risk for colon cancer: ColoNext (APC, BMPR1A, CDH1, CHEK2, GREM1, EPCAM, MLH1, MSH2, MSH6, MUTYH, PMS2, POLD1, POLE, PTEN, SMAD4, STK11, and TP53).  We discussed that some of the genes in the ColoNext panel are associated with specific hereditary cancer syndromes and have published management guidelines::  Driscoll syndrome (MLH1, MSH2, MSH6, PMS2, EPCAM), Familial Adenomatous Polyposis (APC), MUTYH Associated Polyposis (MUTYH), Juvenile Polyposis syndrome (SMAD4, BMPR1A), Peutz-Jeghers syndrome (STK11), Cowden syndrome (PTEN), Li Fraumeni syndrome (TP53), Hereditary Diffuse Gastric Cancer (CDH1).   The CHEK2, GREM1, POLD1, and POLE genes have also been associated with increased colon cancer risk and have published management guidelines.  Matthew was provided with a detailed brochure from [employer] explaining the ColoNext testing.    Matthew elected to pursue a benefits analysis through [employer] to better understand her out of pocket cost for the ColoNext gene panel.    Medical Management: The information from genetic testing may determine additional cancer screening recommendations as well as options for risk reducing surgeries (such as surgery to remove the ovaries and/or uterus) for Matthew and her relatives.  These recommendations will be discussed in detail once genetic  testing is completed.    Plan:  1) Today Matthew elected to pursue a benefits analysis through "[employer] to better understand her out of pocket cost for the ColoNext gene panel.  2) Matthew will be contacted with the expected out of pocket cost when available.  3) If Matthew is comfortable with this estimated out of pocket cost, she will return to clinic to sign the consent form and have her blood drawn.      Face to face time: 40 minutes    Latosha Purdy MS, [facility]  Certified Genetic Counselor  [phone]

## 2017-05-15 ENCOUNTER — TRANSFERRED RECORDS (OUTPATIENT)
Dept: HEALTH INFORMATION MANAGEMENT | Facility: CLINIC | Age: 52
End: 2017-05-15

## 2017-05-22 ENCOUNTER — TRANSFERRED RECORDS (OUTPATIENT)
Dept: HEALTH INFORMATION MANAGEMENT | Facility: CLINIC | Age: 52
End: 2017-05-22

## 2017-05-31 ENCOUNTER — CARE COORDINATION (OUTPATIENT)
Dept: RADIATION ONCOLOGY | Facility: CLINIC | Age: 52
End: 2017-05-31

## 2017-05-31 NOTE — PROGRESS NOTES
Patient was a no show for appointment for radiation therapy follow-up on Wednesday, 5/24/2017 with Marisa Olivarez NP.  This RN contacted patient, patient reports she is feeling well, denies diarrhea, denies nausea/vomiting, denies pain or skin concerns and reports improvement in energy level.  Patient reports she has been seen by Dr. Lopez and surgery is scheduled for 6/29/2017, she also notes that she is scheduled to see Dr. Lion on 6/13/2017.  Patient wishes to not reschedule radiation follow-up at this time and will contact this RN if any questions or concerns should arise.    Summer Brooks, RN BSN OCN

## 2017-06-13 ENCOUNTER — ONCOLOGY VISIT (OUTPATIENT)
Dept: ONCOLOGY | Facility: CLINIC | Age: 52
End: 2017-06-13
Payer: COMMERCIAL

## 2017-06-13 ENCOUNTER — CARE COORDINATION (OUTPATIENT)
Dept: RADIATION ONCOLOGY | Facility: CLINIC | Age: 52
End: 2017-06-13

## 2017-06-13 VITALS
RESPIRATION RATE: 15 BRPM | DIASTOLIC BLOOD PRESSURE: 75 MMHG | SYSTOLIC BLOOD PRESSURE: 123 MMHG | OXYGEN SATURATION: 98 % | BODY MASS INDEX: 38.49 KG/M2 | WEIGHT: 231 LBS | HEART RATE: 89 BPM | HEIGHT: 65 IN | TEMPERATURE: 97.2 F

## 2017-06-13 DIAGNOSIS — C20 MALIGNANT NEOPLASM OF RECTUM (H): Primary | ICD-10-CM

## 2017-06-13 LAB — MISCELLANEOUS TEST: NORMAL

## 2017-06-13 PROCEDURE — 99214 OFFICE O/P EST MOD 30 MIN: CPT | Performed by: INTERNAL MEDICINE

## 2017-06-13 PROCEDURE — 99207 ZZC NO CHARGE LOS: CPT | Performed by: GENETIC COUNSELOR, MS

## 2017-06-13 ASSESSMENT — PAIN SCALES - GENERAL: PAINLEVEL: NO PAIN (0)

## 2017-06-13 NOTE — PROGRESS NOTES
"2017        CHIEF COMPLAINT:  Follow up for cT3 N2 Stage IIIB or IIIC moderately differentiated adenocarcinoma of the rectum.  MSI is intact.      Colorectal Surgeon:  Dr. Lopez at [facility]  Primary Radiation Oncologist: Dr. Lovelace     HISTORY OF PRESENT ILLNESS:    Please see my prior note for details. She was recently diagnosed with locally advanced rectal cancer.  She started neoadjuvant treatment with with Xeloda 2000mg in AM and 1500mg in PM along with radiotherapy on 3/20/17.  She completed her carmela-adjuvant treatment on 2017    INTERVAL HISTORY  HISTORY OF PRESENT ILLNESS:  Matthew is here with her  and she tells she is doing well.  She has some loose stools but no pain or bleeding.  She has been able to eat and drink well without any nausea or vomiting.  Her energy is improving.  She does not have any hand-foot syndrome.  She denies any interval infections.  She had repeat scans done at the surgeon's office and there was no evidence for metastatic disease and her primary tumor also showed a very good response.  She is scheduled to get the surgery done on .      REVIEW OF SYSTEMS:  Otherwise, a comprehensive review of the systems was performed and it was negative.           I reviewed other hx in [employer] as below    PAST MEDICAL HISTORY:  She had a hernia repair at 3 months,  in , left knee meniscus surgery in , appendix surgery in , and fibroadenoma removed from the right breast in 2016.      FAMILY HISTORY:  Mother had uterine cancer.  The patient has 2 kids who are healthy.      SOCIAL HISTORY:  She does not smoke.  Drinks alcohol occasionally.  Works in the [employer].  She lives with her  and 2 kids.      ALLERGIES:  None.      MEDICATIONS:  Reviewed    PHYSICAL EXAM  /75  Pulse 89  Temp 97.2  F (36.2  C)  Resp 15  Ht 1.651 m (5' 5\")  Wt 104.8 kg (231 lb)  SpO2 98%  BMI 38.44 kg/m2  PHYSICAL EXAMINATION:     CONSTITUTIONAL:  " She is a middle-aged female in no apparent distress.   EYES:  Pupils are equal and reactive to light and accommodation without any pallor or icterus.   ENT:  No oral lesions.  Ears unremarkable.   CARDIOVASCULAR:  S1, S2 audible and normal.   RESPIRATORY:  Clear chest.   GASTROINTESTINAL:  Benign abdomen.   INTEGUMENT:  No concerning skin rashes.   LYMPHATICS:  No palpable lymph nodes.   PSYCHIATRIC:  Mood and affect are appropriate.   EXTREMITIES:  No pedal edema.         LABORATORY DATA:    Previous labs reviewed      MRI Pelvis 5/15/17  Impression:     1. Decrease in size of the rectal mass which now has MR characteristics of residual scarring.     2. Significant decrease in size and number of the mesorectal lymph nodes.    CT CAP 5/15/17  Impression:  1. Indeterminate 2.6 x 1.9 cm hypodense lesion in hepatic segment VIII, new since CT abdomen pelvis 06/24/2008. This is not definitively characterized as a cyst.  Recommend contrast-enhanced MRI of the liver. Few additional subcentimeter low attenuation lesions in both lobes of the liver are too small to characterize.   2. Wall thickening of the rectum consistent with known primary neoplasm.   3. No evidence of metastatic disease in the chest.   4. No lymphadenopathy in the chest, abdomen, or pelvis.    MRI Abdomen 5/22/17  Impression:  A few cysts in the liver.  No suspicious liver lesions identified.    ASSESSMENT AND PLAN:     1.  She has stage IIIB or IIIC moderately differentiated adenocarcinoma of the rectum.  MSI is intact.  It is a cT3 N2 disease.  I am not sure if it is an N2b or N2c disease at this time.    ASSESSMENT AND PLAN:  She completed concurrent radiation with oral Xeloda on 04/26/2017.  She had repeat scanning done on 05/15/2017 which showed a positive response to treatment and no evidence of metastatic disease, although on a CT of the abdomen there was an indeterminate liver lesion for which she underwent an MRI of the abdomen which only showed  a few cysts but no suspicious liver lesions.  She is scheduled to have the surgery done on 06/29.  I wished her well and I would like to see her back after the surgery to discuss any adjuvant chemotherapy which is needed.  She is scheduled to see me for 07/21/2017.      We discussed the importance of regular exercise so as to maintain general health and recover from the side effects of the treatment.        We also discussed the importance of maintaining good and healthy nutrition.      All of her questions were answered to her satisfaction, and she is agreeable and comfortable with this plan.       She had genetic testing sent today         Wisam Lion

## 2017-06-13 NOTE — MR AVS SNAPSHOT
After Visit Summary   6/13/2017    Matthew Stover    MRN: [id number]           Patient Information     Date Of Birth          1965        Visit Information        Provider Department      6/13/2017 1:15 PM Latosha Purdy [facility]        Today's Diagnoses     Malignant neoplasm of rectum (H)    -  1       Follow-ups after your visit        Your next 10 appointments already scheduled     Jun 13, 2017  2:15 PM CDT   Return Visit with Wisam Lion MD   [facility])    [address] [place] [id number] [phone]            Jul 21, 2017 10:00 AM CDT   LAB with LAB ONC [facility])    [id number] [place] [address] [phone]           Patient must bring picture ID.  Patient should be prepared to give a urine specimen  Please do not eat 10-12 hours before your appointment if you are coming in fasting for labs on lipids, cholesterol, or glucose (sugar).  Pregnant women should follow their Care Team instructions. Water with medications is okay. Do not drink coffee or other fluids.   If you have concerns about taking  your medications, please ask at office or if scheduling via [employer], send a message by clicking on Secure Messaging, Message Your Care Team.            Jul 21, 2017 10:45 AM CDT   Return Visit with Wisam Lion MD   [facility])    [id number] [place] [address] [phone]              Who to contact     If you have questions or need follow up information about today's clinic visit or your schedule please contact [facility] directly at [phone].  Normal or non-critical lab and imaging results will be communicated to you by MyChart, letter or phone within 4 business days after the clinic has received the results. If you do not hear from us within 7  days, please contact the clinic through [employer] or phone. If you have a critical or abnormal lab result, we will notify you by phone as soon as possible.  Submit refill requests through [employer] or call your pharmacy and they will forward the refill request to us. Please allow 3 business days for your refill to be completed.          Additional Information About Your Visit        [employer] Information     [employer] gives you secure access to your electronic health record. If you see a primary care provider, you can also send messages to your care team and make appointments. If you have questions, please call your primary care clinic.  If you do not have a primary care provider, please call [phone] and they will assist you.      [employer] is an electronic gateway that provides easy, online access to your medical records. With [employer], you can request a clinic appointment, read your test results, renew a prescription or communicate with your care team.     To access your existing account, please contact your [facility] or call [phone] for assistance.        Care EveryWhere ID     This is your Care EveryWhere ID. This could be used by other organizations to access your [place] medical records  [id number]         Blood Pressure from Last 3 Encounters:   04/26/17 (!) 136/97   04/19/17 (!) 143/94   04/12/17 134/84    Weight from Last 3 Encounters:   04/26/17 102.1 kg (225 lb)   04/26/17 102.1 kg (225 lb)   04/20/17 102.5 kg (226 lb)               Primary Care Provider Office Phone # Fax #    Mal GAVIN Ladd [phone]       [address] [address]        Thank you!     Thank you for choosing [facility]  for your care. Our goal is always to provide you with excellent care. Hearing back from our patients is one way we can continue to improve our services. Please take a few minutes to complete the written survey that you may receive in  the mail after your visit with us. Thank you!             Your Updated Medication List - Protect others around you: Learn how to safely use, store and throw away your medicines at www.disposemymeds.org.          This list is accurate as of: 6/13/17  1:58 PM.  Always use your most recent med list.                   Brand Name Dispense Instructions for use    capecitabine 500 MG tablet CHEMO    XELODA    196 tablet    Take 4 tablets (2000mg) in the AM and 3 tablets (1500mg) in the PM. Take for 5 days per week Monday-Friday. Do NOT take on Saturday-Sunday. Take with water within 30 minutes after meal.       LORazepam 0.5 MG tablet    ATIVAN    30 tablet    Take 1 tablet (0.5 mg) by mouth every 4 hours as needed (Anxiety, Nausea/Vomiting or Sleep)       prochlorperazine 10 MG tablet    COMPAZINE    30 tablet    Take 1 tablet (10 mg) by mouth every 6 hours as needed (Nausea/Vomiting)

## 2017-06-13 NOTE — LETTER
Cancer Risk Management  Program Locations    [facility]  [facility]  Mailing Address  Cancer Risk Management Program  [facility]  [address]  [place] [address]    New patient appointments  [phone]  June 13, 2017    Matthew Stover  [address] [address] [address]      Dear Matthew,    It was a pleasure meeting with you again at the [facility].  Here is a copy of the progress note from your recent genetic counseling visit to the Cancer Risk Management Program on 6/13/2017.  If you have any additional questions, please feel free to call.    Referring Provider: Wisam Lion MD; NOELLE Franco CNP    Presenting Information:   Matthew Stover came in to clinic for a follow-up visit today. She had previously been seen in the Cancer Risk Management Program at the [facility] on 5/10/2017. Matthew came to clinic today to have her blood drawn for the ColoNext panel through [employer].    Discussion:    We previously reviewed the details of Matthew's family and personal history. See note from previous appointment for details.     After meeting with Matthew on 5/10/2017, she had decided to pursue a pre authorization through [employer] to better understand if genetic testing of the ColoNext gene panel would be covered through her insurance company. Per [employer], Matthew's expected out of pocket cost will be $0.      Genetic testing is available for 17 genes associated with increased risk for colorectal cancer: ColoNext (APC, BMPR1A, CDH1, CHEK2, GREM1, EPCAM, MLH1, MSH2, MSH6, MUTYH, PMS2, POLD1, POLE, PTEN, SMAD4, STK11, and TP53).  We previously discussed that some of the genes in the ColoNext panel are associated with specific hereditary cancer syndromes and have published  management guidelines:  Driscoll syndrome (MLH1, MSH2, MSH6, PMS2, EPCAM), Familial Adenomatous Polyposis (APC), MUTYH Associated Polyposis (MUTYH), Juvenile Polyposis syndrome (SMAD4, BMPR1A), Peutz-Jeghers syndrome (STK11), Cowden syndrome (PTEN), Li Fraumeni syndrome (TP53), Hereditary Diffuse Gastric Cancer (CDH1).   The CHEK2, GREM1, POLD1, and POLE genes have also been associated with increased colon cancer risk and have published management guidelines.  Matthew was previously provided with a detailed brochure from [employer] explaining the ColoNext testing.    Consent was obtained and genetic testing for ColoNext was sent to [facility].     The information from this test may determine cancer screening recommendations as well as options for risk reducing surgeries for Matthew and family members.  These recommendations will be discussed in detail once genetic testing is completed.    Plan:  1. All of Matthew's questions were answered.   2. Matthew signed a consent form at the conclusion of this visit and had her blood drawn for the ColoNext panel through [employer].   3. Matthew will return to clinic to discuss the results.  Turn around time: 3-4 weeks     Latosha Purdy MS, [employer]  Certified Genetic Counselor  [phone]

## 2017-06-13 NOTE — MR AVS SNAPSHOT
After Visit Summary   6/13/2017    Matthew Stover    MRN: [id number]           Patient Information     Date Of Birth          1965        Visit Information        Provider Department      6/13/2017 2:15 PM Wisam Lion MD [facility]        Today's Diagnoses     Malignant neoplasm of rectum (H)    -  1      Care Instructions    See me back after surgery on 7/21 with labs prior          Follow-ups after your visit        Your next 10 appointments already scheduled     Jul 21, 2017 10:00 AM CDT   LAB with LAB ONC [facility] ([facility])    [address] [place] [address] [phone]           Patient must bring picture ID.  Patient should be prepared to give a urine specimen  Please do not eat 10-12 hours before your appointment if you are coming in fasting for labs on lipids, cholesterol, or glucose (sugar).  Pregnant women should follow their Care Team instructions. Water with medications is okay. Do not drink coffee or other fluids.   If you have concerns about taking  your medications, please ask at office or if scheduling via G-[employer], send a message by clicking on Secure Messaging, Message Your Care Team.            Jul 21, 2017 10:45 AM CDT   Return Visit with Wisam Lion MD   [facility] ([facility])    [address] [address] [place] [address] [phone]              Who to contact     If you have questions or need follow up information about today's clinic visit or your schedule please contact [facility] directly at [phone].  Normal or non-critical lab and imaging results will be communicated to you by MyChart, letter or phone within 4 business days after the clinic has received the results. If you do not hear from us within 7 days, please contact the clinic through MyChart or phone. If you have a critical or abnormal lab result, we will notify  "you by phone as soon as possible.  Submit refill requests through [employer]fm or call your pharmacy and they will forward the refill request to us. Please allow 3 business days for your refill to be completed.          Additional Information About Your Visit        [employer] Information     [employer]fm gives you secure access to your electronic health record. If you see a primary care provider, you can also send messages to your care team and make appointments. If you have questions, please call your primary care clinic.  If you do not have a primary care provider, please call [phone] and they will assist you.      Last[employer]fm is an electronic gateway that provides easy, online access to your medical records. With [employer]fm, you can request a clinic appointment, read your test results, renew a prescription or communicate with your care team.     To access your existing account, please contact your [facility] or call [phone] for assistance.        Care EveryWhere ID     This is your Care EveryWhere ID. This could be used by other organizations to access your [place] medical records  [id number]        Your Vitals Were     Pulse Temperature Respirations Height Pulse Oximetry BMI (Body Mass Index)    89 97.2  F (36.2  C) 15 1.651 m (5' 5\") 98% 38.44 kg/m2       Blood Pressure from Last 3 Encounters:   06/13/17 123/75   04/26/17 (!) 136/97   04/19/17 (!) 143/94    Weight from Last 3 Encounters:   06/13/17 104.8 kg (231 lb)   04/26/17 102.1 kg (225 lb)   04/26/17 102.1 kg (225 lb)               Primary Care Provider Office Phone # Fax #    Mal ALONSO Julee [phone]       [address] [address]        Thank you!     Thank you for choosing [facility]  for your care. Our goal is always to provide you with excellent care. Hearing back from our patients is one way we can continue to improve our services. Please take a few minutes to complete the " written survey that you may receive in the mail after your visit with us. Thank you!             Your Updated Medication List - Protect others around you: Learn how to safely use, store and throw away your medicines at www.disposemymeds.org.          This list is accurate as of: 6/13/17 11:59 PM.  Always use your most recent med list.                   Brand Name Dispense Instructions for use    capecitabine 500 MG tablet CHEMO    XELODA    196 tablet    Take 4 tablets (2000mg) in the AM and 3 tablets (1500mg) in the PM. Take for 5 days per week Monday-Friday. Do NOT take on Saturday-Sunday. Take with water within 30 minutes after meal.       LORazepam 0.5 MG tablet    ATIVAN    30 tablet    Take 1 tablet (0.5 mg) by mouth every 4 hours as needed (Anxiety, Nausea/Vomiting or Sleep)       prochlorperazine 10 MG tablet    COMPAZINE    30 tablet    Take 1 tablet (10 mg) by mouth every 6 hours as needed (Nausea/Vomiting)

## 2017-06-13 NOTE — NURSING NOTE
"Oncology Rooming Note    June 13, 2017 2:14 PM   Matthew Stover is a 51 year old female who presents for:    Chief Complaint   Patient presents with     Oncology Clinic Visit     follow up     Initial Vitals: /75  Pulse 89  Temp 97.2  F (36.2  C)  Resp 15  Ht 1.651 m (5' 5\")  Wt 104.8 kg (231 lb)  SpO2 98%  BMI 38.44 kg/m2 Estimated body mass index is 38.44 kg/(m^2) as calculated from the following:    Height as of this encounter: 1.651 m (5' 5\").    Weight as of this encounter: 104.8 kg (231 lb). Body surface area is 2.19 meters squared.  No Pain (0) Comment: Data Unavailable   No LMP recorded. Patient has had an ablation.  Allergies reviewed: Yes  Medications reviewed: Yes    Medications: Medication refills not needed today.  Pharmacy name entered into Divergence: [id number] - [place], [place] - [address]        5 minutes for nursing intake (face to face time)     Tonie Buitrago LPN              "

## 2017-06-13 NOTE — PROGRESS NOTES
Met with patient while in clinic today for scheduled appointments, patient does not feel a radiation oncology follow-up visit is needed.  She reports she is feeling well, noticing hot flashes and will further discuss with Dr. Lion during scheduled visit today.  Reviewed continued use of vaginal dilator with patient, she reports she has no longer been using.  Reviewed recommendation to restart use of vaginal dilator five to seven days per week for the next three months and then may decrease use to three days per week.  Informed patient that on days she has sexual intercourse, she does not need to use vaginal dilator.  Patient verbalized understanding of all information and had no further questions or concerns at this time.    Summer Brooks, RN BSN OCN

## 2017-06-13 NOTE — PROGRESS NOTES
6/13/2017    Referring Provider: Wisam Lion MD; NOELLE Franco CNP    Presenting Information:   Matthew Stover came in to clinic for a follow-up visit today. She had previously been seen in the Cancer Risk Management Program at the [facility] on 5/10/2017. Matthew came to clinic today to have her blood drawn for the ColoNext panel through [employer].    Discussion:    We previously reviewed the details of Matthew's family and personal history. See note from previous appointment for details.     After meeting with Matthew on 5/10/2017, she had decided to pursue a pre authorization through [employer] to better understand if genetic testing of the ColoNext gene panel would be covered through her insurance company. Per [employer], Matthew's expected out of pocket cost will be $0.      Genetic testing is available for 17 genes associated with increased risk for colorectal cancer: ColoNext (APC, BMPR1A, CDH1, CHEK2, GREM1, EPCAM, MLH1, MSH2, MSH6, MUTYH, PMS2, POLD1, POLE, PTEN, SMAD4, STK11, and TP53).  We previously discussed that some of the genes in the ColoNext panel are associated with specific hereditary cancer syndromes and have published management guidelines:  Driscoll syndrome (MLH1, MSH2, MSH6, PMS2, EPCAM), Familial Adenomatous Polyposis (APC), MUTYH Associated Polyposis (MUTYH), Juvenile Polyposis syndrome (SMAD4, BMPR1A), Peutz-Jeghers syndrome (STK11), Cowden syndrome (PTEN), Li Fraumeni syndrome (TP53), Hereditary Diffuse Gastric Cancer (CDH1).   The CHEK2, GREM1, POLD1, and POLE genes have also been associated with increased colon cancer risk and have published management guidelines.  Matthew was previously provided with a detailed brochure from [employer] explaining the ColoNext testing.    Consent was obtained and genetic testing for ColoNext was sent to [facility].     The information from this test may determine cancer screening recommendations as well as options for  risk reducing surgeries for Matthew and family members.  These recommendations will be discussed in detail once genetic testing is completed.    Plan:  1. All of Matthew's questions were answered.   2. Matthew signed a consent form at the conclusion of this visit and had her blood drawn for the ColoNext panel through [employer].   3. Matthew will return to clinic to discuss the results.  Turn around time: 3-4 weeks     Face to face time 15 minutes.    Latosha Purdy MS, [facility] [place] [facility]  Certified Genetic Counselor  [phone]

## 2017-06-29 LAB — LAB SCANNED RESULT: ABNORMAL

## 2017-07-01 ENCOUNTER — HEALTH MAINTENANCE LETTER (OUTPATIENT)
Age: 52
End: 2017-07-01

## 2017-07-12 ENCOUNTER — CARE COORDINATION (OUTPATIENT)
Dept: ONCOLOGY | Facility: CLINIC | Age: 52
End: 2017-07-12

## 2017-07-28 ENCOUNTER — ONCOLOGY VISIT (OUTPATIENT)
Dept: ONCOLOGY | Facility: CLINIC | Age: 52
End: 2017-07-28
Payer: COMMERCIAL

## 2017-07-28 VITALS
WEIGHT: 204 LBS | OXYGEN SATURATION: 96 % | BODY MASS INDEX: 33.99 KG/M2 | HEIGHT: 65 IN | SYSTOLIC BLOOD PRESSURE: 136 MMHG | RESPIRATION RATE: 15 BRPM | HEART RATE: 139 BPM | TEMPERATURE: 97.3 F | DIASTOLIC BLOOD PRESSURE: 68 MMHG

## 2017-07-28 DIAGNOSIS — C20 MALIGNANT NEOPLASM OF RECTUM (H): ICD-10-CM

## 2017-07-28 LAB
ALBUMIN SERPL-MCNC: 2.3 G/DL (ref 3.4–5)
ALP SERPL-CCNC: 110 U/L (ref 40–150)
ALT SERPL W P-5'-P-CCNC: 19 U/L (ref 0–50)
ANION GAP SERPL CALCULATED.3IONS-SCNC: 10 MMOL/L (ref 3–14)
AST SERPL W P-5'-P-CCNC: 13 U/L (ref 0–45)
BILIRUB SERPL-MCNC: 0.6 MG/DL (ref 0.2–1.3)
BUN SERPL-MCNC: 8 MG/DL (ref 7–30)
CALCIUM SERPL-MCNC: 8.9 MG/DL (ref 8.5–10.1)
CEA SERPL-MCNC: 1.7 UG/L (ref 0–2.5)
CHLORIDE SERPL-SCNC: 93 MMOL/L (ref 94–109)
CO2 SERPL-SCNC: 27 MMOL/L (ref 20–32)
CREAT SERPL-MCNC: 0.8 MG/DL (ref 0.52–1.04)
DIFFERENTIAL METHOD BLD: ABNORMAL
ERYTHROCYTE [DISTWIDTH] IN BLOOD BY AUTOMATED COUNT: 13.3 % (ref 10–15)
GFR SERPL CREATININE-BSD FRML MDRD: 76 ML/MIN/1.7M2
GLUCOSE SERPL-MCNC: 113 MG/DL (ref 70–99)
HCT VFR BLD AUTO: 32 % (ref 35–47)
HGB BLD-MCNC: 10.9 G/DL (ref 11.7–15.7)
LYMPHOCYTES # BLD AUTO: 0.4 10E9/L (ref 0.8–5.3)
LYMPHOCYTES NFR BLD AUTO: 4 %
MCH RBC QN AUTO: 28.9 PG (ref 26.5–33)
MCHC RBC AUTO-ENTMCNC: 34.1 G/DL (ref 31.5–36.5)
MCV RBC AUTO: 85 FL (ref 78–100)
MONOCYTES # BLD AUTO: 0.3 10E9/L (ref 0–1.3)
MONOCYTES NFR BLD AUTO: 3 %
NEUTROPHILS # BLD AUTO: 10.2 10E9/L (ref 1.6–8.3)
NEUTROPHILS NFR BLD AUTO: 93 %
PLATELET # BLD AUTO: 426 10E9/L (ref 150–450)
PLATELET # BLD EST: NORMAL 10*3/UL
POTASSIUM SERPL-SCNC: 3.4 MMOL/L (ref 3.4–5.3)
PROT SERPL-MCNC: 7.9 G/DL (ref 6.8–8.8)
RBC # BLD AUTO: 3.77 10E12/L (ref 3.8–5.2)
RBC MORPH BLD: NORMAL
SODIUM SERPL-SCNC: 130 MMOL/L (ref 133–144)
WBC # BLD AUTO: 10.9 10E9/L (ref 4–11)

## 2017-07-28 PROCEDURE — 85025 COMPLETE CBC W/AUTO DIFF WBC: CPT | Performed by: INTERNAL MEDICINE

## 2017-07-28 PROCEDURE — 36415 COLL VENOUS BLD VENIPUNCTURE: CPT | Performed by: INTERNAL MEDICINE

## 2017-07-28 PROCEDURE — 99215 OFFICE O/P EST HI 40 MIN: CPT | Performed by: INTERNAL MEDICINE

## 2017-07-28 PROCEDURE — 82378 CARCINOEMBRYONIC ANTIGEN: CPT | Performed by: INTERNAL MEDICINE

## 2017-07-28 PROCEDURE — 80053 COMPREHEN METABOLIC PANEL: CPT | Performed by: INTERNAL MEDICINE

## 2017-07-28 ASSESSMENT — PAIN SCALES - GENERAL: PAINLEVEL: MODERATE PAIN (5)

## 2017-07-28 NOTE — MR AVS SNAPSHOT
After Visit Summary   7/28/2017    Matthew Stover    MRN: [id number]           Patient Information     Date Of Birth          1965        Visit Information        Provider Department      7/28/2017 4:15 PM Wisam Lion MD [facility]        Today's Diagnoses     Malignant neoplasm of rectum (H)          Care Instructions    See me back on Aug 16th    Follow with your surgeon              Follow-ups after your visit        Your next 10 appointments already scheduled     Aug 16, 2017  2:15 PM CDT   Return Visit with Wisam Lion MD   [facility] ([facility])    [address] [place] [address] [phone]              Who to contact     If you have questions or need follow up information about today's clinic visit or your schedule please contact [facility] directly at [phone].  Normal or non-critical lab and imaging results will be communicated to you by [employer], letter or phone within 4 business days after the clinic has received the results. If you do not hear from us within 7 days, please contact the clinic through [employer] or phone. If you have a critical or abnormal lab result, we will notify you by phone as soon as possible.  Submit refill requests through [employer] or call your pharmacy and they will forward the refill request to us. Please allow 3 business days for your refill to be completed.          Additional Information About Your Visit        MyChart Information     [employer] gives you secure access to your electronic health record. If you see a primary care provider, you can also send messages to your care team and make appointments. If you have questions, please call your primary care clinic.  If you do not have a primary care provider, please call [phone] and they will assist you.      [employer] is an electronic gateway that provides easy, online access to your medical records. With [employer],  "you can request a clinic appointment, read your test results, renew a prescription or communicate with your care team.     To access your existing account, please contact your [facility] or call [phone] for assistance.        Care EveryWhere ID     This is your Care EveryWhere ID. This could be used by other organizations to access your [facility] medical records  [id number]        Your Vitals Were     Pulse Temperature Respirations Height Pulse Oximetry BMI (Body Mass Index)    139 97.3  F (36.3  C) 15 1.651 m (5' 5\") 96% 33.95 kg/m2       Blood Pressure from Last 3 Encounters:   07/28/17 136/68   06/13/17 123/75   04/26/17 (!) 136/97    Weight from Last 3 Encounters:   07/28/17 92.5 kg (204 lb)   06/13/17 104.8 kg (231 lb)   04/26/17 102.1 kg (225 lb)              We Performed the Following     *CBC with platelets differential     Carcinoembryonic Antigen     Comprehensive metabolic panel        Primary Care Provider Office Phone # Fax #    Mal Ladd [phone]       [address] [address]        Equal Access to Services     ZABRINA JEFFERS : Hadii aad ku hadasho Soidaliaali, waaxda luqadaha, qaybta kaalmada adeegyada, jonelle gibbs haystevon yeimi maceod . So [facility] [phone].    ATENCIÓN: Si habla español, tiene a sims disposición servicios gratuitos de asistencia lingüística. [facility] [phone].    We comply with applicable federal civil rights laws and [place] laws. We do not discriminate on the basis of race, color, national origin, age, disability sex, sexual orientation or gender identity.            Thank you!     Thank you for choosing [facility]  for your care. Our goal is always to provide you with excellent care. Hearing back from our patients is one way we can continue to improve our services. Please take a few minutes to complete the written survey that you may receive in the mail after your visit with " us. Thank you!             Your Updated Medication List - Protect others around you: Learn how to safely use, store and throw away your medicines at www.disposemymeds.org.          This list is accurate as of: 7/28/17  4:22 PM.  Always use your most recent med list.                   Brand Name Dispense Instructions for use Diagnosis    AMOXICILLIN PO           capecitabine 500 MG tablet CHEMO    XELODA    196 tablet    Take 4 tablets (2000mg) in the AM and 3 tablets (1500mg) in the PM. Take for 5 days per week Monday-Friday. Do NOT take on Saturday-Sunday. Take with water within 30 minutes after meal.    Malignant neoplasm of rectum (H)       CIPROFLOXACIN PO      Take 500 mg by mouth        DILAUDID PO           LORazepam 0.5 MG tablet    ATIVAN    30 tablet    Take 1 tablet (0.5 mg) by mouth every 4 hours as needed (Anxiety, Nausea/Vomiting or Sleep)    Malignant neoplasm of rectum (H)       prochlorperazine 10 MG tablet    COMPAZINE    30 tablet    Take 1 tablet (10 mg) by mouth every 6 hours as needed (Nausea/Vomiting)    Malignant neoplasm of rectum (H)

## 2017-07-28 NOTE — NURSING NOTE
"Oncology Rooming Note    July 28, 2017 3:56 PM   Matthew Stover is a 51 year old female who presents for:    Chief Complaint   Patient presents with     Oncology Clinic Visit     follow up     Initial Vitals: /68  Pulse 139  Temp 97.3  F (36.3  C)  Resp 15  Ht 1.651 m (5' 5\")  Wt 92.5 kg (204 lb)  SpO2 96%  BMI 33.95 kg/m2 Estimated body mass index is 33.95 kg/(m^2) as calculated from the following:    Height as of this encounter: 1.651 m (5' 5\").    Weight as of this encounter: 92.5 kg (204 lb). Body surface area is 2.06 meters squared.  Moderate Pain (5) Comment: abdominal pain.    No LMP recorded. Patient has had an ablation.  Allergies reviewed: Yes  Medications reviewed: Yes    Medications: Medication refills not needed today.  Pharmacy name entered into [employer]: [id number] - [place], [place] - [address]        5 minutes for nursing intake (face to face time)     Tonie Buitrago LPN              "

## 2017-07-28 NOTE — PROGRESS NOTES
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  MSI is intact.      Colorectal Surgeon:  Dr. Lopez at [facility]  Primary Radiation Oncologist: Dr. Lovelace     HISTORY OF PRESENT ILLNESS:    Please see my prior note for details. She has locally advanced rectal cancer.  She started neoadjuvant treatment with with Xeloda 2000mg in AM and 1500mg in PM along with radiotherapy on 3/20/17.  She completed her carmela-adjuvant treatment on 4/26/2017  On 6/29/2017 she had low anterior resection and the final pathology does not reveal any residual invasive cancer. 9 lymph nodes were removed and all were negative for malignancy. There was a tubular adenoma with high-grade dysplasia present in the rectum. All margins were negative.     Her final pathology was pTis N0.    After her surgery she developed an anastomotic leak needing drain placement and antibiotic course. She is going to finish amoxicillin today she had her final drain out today. She still is in significant amount of pain in the rectum area for which he takes Dilaudid as needed but she takes it about 4 per day. She tells that today was the more hectic today because she was walking a lot so today her pain is worse. During this time her appetite has been down. She has not had any nausea or vomiting. Her ostomy is working fine without bleeding. She was also found to have right hydronephrosis requiring a right ureteral stent placement. She is currently on antibiotics for a urinary tract infection. She denies new swellings.  She also tells me that after her surgery she developed vocal cord injury during general anesthesia. Since then her voice has been hoarse. She is going to see speech pathologist next week.  She has been feeling more fatigued than usual but has been trying to keep herself active. During this time she has lost significant amount of weight more than 20 pounds.        ROS:  TORY  "comprehensive ROS was otherwise neg      I reviewed other hx in [facility] as below    PAST MEDICAL HISTORY:  She had a hernia repair at 3 months,  in , left knee meniscus surgery in , appendix surgery in , and fibroadenoma removed from the right breast in 2016.      FAMILY HISTORY:  Mother had uterine cancer.  The patient has 2 kids who are healthy.      SOCIAL HISTORY:  She does not smoke.  Drinks alcohol occasionally.  Works in the [employer].  She lives with her  and 2 kids.      ALLERGIES:  None.      MEDICATIONS:  Reviewed    PHYSICAL EXAM  /68  Pulse 139  Temp 97.3  F (36.3  C)  Resp 15  Ht 1.651 m (5' 5\")  Wt 92.5 kg (204 lb)  SpO2 96%  BMI 33.95 kg/m2     CONSTITUTIONAL: She is in mild distress secondary to pain. Her voice is hoarse.  EYES: PERRLA, no palor or icterus.   ENT/MOUTH: no mouth lesions. Oropharynx normal  CVS: s1s2 tachycardic  RESPIRATORY: clear to auscultation b/l  GI: Ostomy site is clean. The drain sites are also clean. Abdomen is mildly tender but no guarding or rigidity or rebound.  NEURO: AAOX3  Grossly non focal neuro exam  INTEGUMENT: no obvious rashes  LYMPHATIC: no palpable cervical, supraclavicular, axillary or inguinal LAD  MUSCULOSKELETAL: Unremarkable. No bony tenderness.   EXTREMITIES: no edema  PSYCH: Mentation, mood and affect are normal. Decision making capacity is intact      LABORATORY DATA:    Labs reviewed      MRI Pelvis 5/15/17  Impression:     1. Decrease in size of the rectal mass which now has MR characteristics of residual scarring.     2. Significant decrease in size and number of the mesorectal lymph nodes.    CT CAP 5/15/17  Impression:  1. Indeterminate 2.6 x 1.9 cm hypodense lesion in hepatic segment VIII, new since CT abdomen pelvis 2008. This is not definitively characterized as a cyst.  Recommend contrast-enhanced MRI of the liver. Few additional subcentimeter low attenuation lesions in both lobes of the liver " are too small to characterize.   2. Wall thickening of the rectum consistent with known primary neoplasm.   3. No evidence of metastatic disease in the chest.   4. No lymphadenopathy in the chest, abdomen, or pelvis.    MRI Abdomen 5/22/17  Impression:  A few cysts in the liver.  No suspicious liver lesions identified.  CT abdomen and pelvis with contrast dated 7/2/2017    IMPRESSION:  1. Mild right-sided hydroureter /hydronephrosis with enhancement of the distal right ureter. This is nonspecific and may be related to an infectious etiology. Please correlate clinically and with laboratory findings.  2. Questionable pericholecystic fluid.  Please correlate for the possibility of acute cholecystitis.   3. Recent posterior changes as described above.  4. Surgical drain in place.  5. Nonspecific splenomegaly.  CT chest pulmonary embolism abdomen pelvis with contrast dated 7/6/2017    Impression:  1. No evidence of pulmonary thromboembolism.  There is linear subsegmental atelectasis within the lungs.  2. Development of fluid collections within the right lower quadrant as well as the low pelvic cul de sac and along the left margin of the rectal surgical anastomosis.  CT abdomen and pelvis dated 7/8/2017    Impression:  1. The right lower quadrant fluid collection has largely resolved status post drain placement.  2. Smaller fluid collections within the pelvis are similar to previous.    Pathology also reviewed in detail as mentioned above    ASSESSMENT AND PLAN:     1.  She has stage IIIB or IIIC moderately differentiated adenocarcinoma of the rectum.  MSI is intact.  It is a cT3 N2 disease.  I am not sure if it is an N2b or N2c disease at this time.    She completed concurrent radiation with oral Xeloda on 04/26/2017.  She had repeat scanning done on 05/15/2017 which showed a positive response to treatment and no evidence of metastatic disease, although on a CT of the abdomen there was an indeterminate liver lesion for  which she underwent an MRI of the abdomen which only showed a few cysts but no suspicious liver lesions.  She then had low anterior resection done on 06/29.  Her final pathology showed complete pathological response. There was an area of tubular adenoma with high-grade dysplasia but no invasive cancer was found. 9 lymph nodes were removed and all were negative. All margins were negative. Her final pathology was pTis N0.    We will try to get the slides from her surgery to be reviewed by our pathologist    Her postoperative course has been complicated by an anastomotic leak requiring drain placement. Her drains have been removed as of today. She continues to be on antibiotics and is finishing amoxicillin course today. We discussed that I want her to recuperate and be infection free and more stable before we can discuss in detail about adjuvant chemotherapy.    I would like to see her back on August 16 to see how she is doing and hopefully then we can discuss about starting chemotherapy.    I also to follow very closely with her surgeon    She also has a urinary tract infection and she isn't currently on ciprofloxacin for that    She has significant amount of pain for which she will continue Dilaudid    She developed vocal cord injury during general anesthesia and she is going to see a speech pathologist because she has hoarseness of voice    Nutrition. She has lost significant amount of weight due to poor appetite and other complications related to surgery. Her appetite is slowly improving. I asked her to pay good attention to her nutrition. This would help her to recuperate better    I also encouraged her to try to keep herself active and exercises regularly but not to overdo it to maintain good general health    Previously she had met with genetic counselor and had Colonext genetic testing done. 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 None of the other genes had any actionable  mutations. This was not addressed today    I answered all of her and her 's questions to their satisfaction and they are agreeable and comfortable with this plan         Wisam Lion

## 2017-08-01 PROCEDURE — 00000346 ZZHCL STATISTIC REVIEW OUTSIDE SLIDES TC 88321: Performed by: INTERNAL MEDICINE

## 2017-08-03 LAB — COPATH REPORT: NORMAL

## 2017-08-15 ENCOUNTER — TRANSFERRED RECORDS (OUTPATIENT)
Dept: HEALTH INFORMATION MANAGEMENT | Facility: CLINIC | Age: 52
End: 2017-08-15

## 2017-08-16 ENCOUNTER — ONCOLOGY VISIT (OUTPATIENT)
Dept: ONCOLOGY | Facility: CLINIC | Age: 52
End: 2017-08-16
Payer: COMMERCIAL

## 2017-08-16 VITALS
RESPIRATION RATE: 20 BRPM | OXYGEN SATURATION: 98 % | TEMPERATURE: 97.5 F | DIASTOLIC BLOOD PRESSURE: 84 MMHG | SYSTOLIC BLOOD PRESSURE: 119 MMHG | BODY MASS INDEX: 33.66 KG/M2 | HEART RATE: 104 BPM | WEIGHT: 202 LBS | HEIGHT: 65 IN

## 2017-08-16 DIAGNOSIS — C20 MALIGNANT NEOPLASM OF RECTUM (H): Primary | ICD-10-CM

## 2017-08-16 PROCEDURE — 99215 OFFICE O/P EST HI 40 MIN: CPT | Performed by: INTERNAL MEDICINE

## 2017-08-16 ASSESSMENT — PAIN SCALES - GENERAL: PAINLEVEL: MILD PAIN (3)

## 2017-08-16 NOTE — NURSING NOTE
"Oncology Rooming Note    August 16, 2017 2:17 PM   Matthew Stover is a 51 year old female who presents for:    Chief Complaint   Patient presents with     Oncology Clinic Visit     f/u appt     Initial Vitals: There were no vitals taken for this visit. Estimated body mass index is 33.95 kg/(m^2) as calculated from the following:    Height as of 7/28/17: 1.651 m (5' 5\").    Weight as of 7/28/17: 92.5 kg (204 lb). There is no height or weight on file to calculate BSA.  Data Unavailable Comment: Data Unavailable   No LMP recorded. Patient has had an ablation.  Allergies reviewed: Yes  Medications reviewed: Yes    Medications: Medication refills not needed today.  Pharmacy name entered into [facility]: [facility] [id number] [facility] [id number] [address]    Clinical concerns:     8 minutes for nursing intake (face to face time)     ESTEFANÍA TRUONG LPN              "

## 2017-08-16 NOTE — MR AVS SNAPSHOT
After Visit Summary   8/16/2017    Matthew Stover    MRN: [id number]           Patient Information     Date Of Birth          1965        Visit Information        Provider Department      8/16/2017 2:15 PM Wisam Lion MD [facility]        Today's Diagnoses     Malignant neoplasm of rectum (H)    -  1      Care Instructions    We will start chemotherapy ( Xelox ) once your surgeon thinks it is OK to do so from infection risk point of view    See a provider on the day you start chemo with labs prior              Follow-ups after your visit        Who to contact     If you have questions or need follow up information about today's clinic visit or your schedule please contact [facility] directly at [phone].  Normal or non-critical lab and imaging results will be communicated to you by Magist[employer], letter or phone within 4 business days after the clinic has received the results. If you do not hear from us within 7 days, please contact the clinic through [employer] or phone. If you have a critical or abnormal lab result, we will notify you by phone as soon as possible.  Submit refill requests through PlaceFull or call your pharmacy and they will forward the refill request to us. Please allow 3 business days for your refill to be completed.          Additional Information About Your Visit        MyChart Information     PlaceFull gives you secure access to your electronic health record. If you see a primary care provider, you can also send messages to your care team and make appointments. If you have questions, please call your primary care clinic.  If you do not have a primary care provider, please call [phone] and they will assist you.      PlaceFull is an electronic gateway that provides easy, online access to your medical records. With PlaceFull, you can request a clinic appointment, read your test results, renew a prescription or communicate with your care team.    "  To access your existing account, please contact your [facility] or call [phone] for assistance.        Care EveryWhere ID     This is your Care EveryWhere ID. This could be used by other organizations to access your [place] medical records  [id number]        Your Vitals Were     Pulse Temperature Respirations Height Pulse Oximetry BMI (Body Mass Index)    104 97.5  F (36.4  C) (Oral) 20 1.651 m (5' 5\") 98% 33.61 kg/m2       Blood Pressure from Last 3 Encounters:   08/16/17 119/84   07/28/17 136/68   06/13/17 123/75    Weight from Last 3 Encounters:   08/16/17 91.6 kg (202 lb)   07/28/17 92.5 kg (204 lb)   06/13/17 104.8 kg (231 lb)              Today, you had the following     No orders found for display       Primary Care Provider Office Phone # Fax #    Mal Ladd [phone]       [address] [address]        Equal Access to Services     [facility]: Hadii aad ku hadasho Soomaali, waaxda luqadaha, qaybta kaalmada adeegyada, waxay idiin haymichelle macedo . So [place] -[phone].    ATENCIÓN: Si habla español, tiene a sims disposición servicios gratuitos de asistencia lingüística. Llame al [phone].    We comply with applicable federal civil rights laws and [place] laws. We do not discriminate on the basis of race, color, national origin, age, disability sex, sexual orientation or gender identity.            Thank you!     Thank you for choosing [facility]  for your care. Our goal is always to provide you with excellent care. Hearing back from our patients is one way we can continue to improve our services. Please take a few minutes to complete the written survey that you may receive in the mail after your visit with us. Thank you!             Your Updated Medication List - Protect others around you: Learn how to safely use, store and throw away your medicines at www.disposemymeds.org.          This " list is accurate as of: 8/16/17  4:12 PM.  Always use your most recent med list.                   Brand Name Dispense Instructions for use Diagnosis    capecitabine 500 MG tablet CHEMO    XELODA    196 tablet    Take 4 tablets (2000mg) in the AM and 3 tablets (1500mg) in the PM. Take for 5 days per week Monday-Friday. Do NOT take on Saturday-Sunday. Take with water within 30 minutes after meal.    Malignant neoplasm of rectum (H)       DILAUDID PO           LORazepam 0.5 MG tablet    ATIVAN    30 tablet    Take 1 tablet (0.5 mg) by mouth every 4 hours as needed (Anxiety, Nausea/Vomiting or Sleep)    Malignant neoplasm of rectum (H)       prochlorperazine 10 MG tablet    COMPAZINE    30 tablet    Take 1 tablet (10 mg) by mouth every 6 hours as needed (Nausea/Vomiting)    Malignant neoplasm of rectum (H)

## 2017-08-16 NOTE — PROGRESS NOTES
8/16/2017        Diagnosis:  cT3 N2 Stage IIIB or IIIC moderately differentiated adenocarcinoma of the rectum.  MSI is intact.      Colorectal Surgeon:  Dr. Lopez at [facility]  Primary Radiation Oncologist: Dr. Lovelaec     HISTORY OF PRESENT ILLNESS:    Please see my prior note for details. She has locally advanced rectal cancer.  She started neoadjuvant treatment with with Xeloda 2000mg in AM and 1500mg in PM along with radiotherapy on 3/20/17.  She completed her carmela-adjuvant treatment on 4/26/2017  On 6/29/2017 she had low anterior resection and the final pathology does not reveal any residual invasive cancer. 9 lymph nodes were removed and all were negative for malignancy. There was a tubular adenoma with high-grade dysplasia present in the rectum. All margins were negative.     Her final pathology was pTis N0.    After her surgery she developed an anastomotic leak needing drain placement and antibiotic course.   She completed a course of amoxicillin about 2-1/2 weeks ago. She still has a drain in place and the plan is that once it stopped draining then she will have a repeat CT scan. She tells me that usually drains about 150 cc a day but for the last 2 days it has drained probably half of that. She tells me that her pain is now much better and she is using 1-2 tablets of Dilaudid daily. Ostomy is working well. She did not have any further fevers. She denies nausea and vomiting. She has good appetite. Her weight over the last 3 weeks has remained stable. She thinks her energy is much better now and she is now going out for walks. Denies any bleeding. She had a right ureteral stent removed yesterday. The plan is to do a renal scan in the coming days.   She did not follow up with speech and pathology for the vocal cord injury and she still has the hoarse voice.       ROS:  The rest of the comprehensive review of the system was negative    I reviewed other hx in [employer] as below    PAST MEDICAL HISTORY:   "She had a hernia repair at 3 months,  in , left knee meniscus surgery in , appendix surgery in , and fibroadenoma removed from the right breast in 2016.      FAMILY HISTORY:  Mother had uterine cancer.  The patient has 2 kids who are healthy.      SOCIAL HISTORY:  She does not smoke.  Drinks alcohol occasionally.  Works in the [employer].  She lives with her  and 2 kids.      ALLERGIES:  None.      MEDICATIONS:  Reviewed and takes Dilaudid only    PHYSICAL EXAM  /84  Pulse 104  Temp 97.5  F (36.4  C) (Oral)  Resp 20  Ht 1.651 m (5' 5\")  Wt 91.6 kg (202 lb)  SpO2 98%  BMI 33.61 kg/m2     CONSTITUTIONAL: She looks better today. She is in no acute distress. Her voice is hoarse.  EYES: PERRLA, without any pallor or icterus.   ENT/MOUTH: Ears are normal. No mouth lesions  CVS: s1s2 audible and normal  RESPIRATORY: clear to auscultation b/l  GI: Ostomy site is clean. Abdomen is otherwise nontender without any organomegaly. The pelvic drain site is clean and nontender.   NEURO: She is alert and oriented ×3  INTEGUMENT: no concerning skin rashes  LYMPHATIC: no palpable lymph nodes  EXTREMITIES: no pedal edema  PSYCH: Mentation, mood and affect are appropriate      LABORATORY DATA:    Labs reviewed      MRI Pelvis 5/15/17  Impression:     1. Decrease in size of the rectal mass which now has MR characteristics of residual scarring.     2. Significant decrease in size and number of the mesorectal lymph nodes.    CT CAP 5/15/17  Impression:  1. Indeterminate 2.6 x 1.9 cm hypodense lesion in hepatic segment VIII, new since CT abdomen pelvis 2008. This is not definitively characterized as a cyst.  Recommend contrast-enhanced MRI of the liver. Few additional subcentimeter low attenuation lesions in both lobes of the liver are too small to characterize.   2. Wall thickening of the rectum consistent with known primary neoplasm.   3. No evidence of metastatic disease in the chest. "   4. No lymphadenopathy in the chest, abdomen, or pelvis.    MRI Abdomen 5/22/17  Impression:  A few cysts in the liver.  No suspicious liver lesions identified.  CT abdomen and pelvis with contrast dated 7/2/2017    IMPRESSION:  1. Mild right-sided hydroureter /hydronephrosis with enhancement of the distal right ureter. This is nonspecific and may be related to an infectious etiology. Please correlate clinically and with laboratory findings.  2. Questionable pericholecystic fluid.  Please correlate for the possibility of acute cholecystitis.   3. Recent posterior changes as described above.  4. Surgical drain in place.  5. Nonspecific splenomegaly.  CT chest pulmonary embolism abdomen pelvis with contrast dated 7/6/2017    Impression:  1. No evidence of pulmonary thromboembolism.  There is linear subsegmental atelectasis within the lungs.  2. Development of fluid collections within the right lower quadrant as well as the low pelvic cul de sac and along the left margin of the rectal surgical anastomosis.  CT abdomen and pelvis dated 7/8/2017    Impression:  1. The right lower quadrant fluid collection has largely resolved status post drain placement.  2. Smaller fluid collections within the pelvis are similar to previous.    Pathology   FINAL DIAGNOSIS:   CASE FROM [place], [place] ([id number],   OBTAINED 06/29/2017):     A. RECTUM AND SIGMOID COLON, LOW ANTERIOR RESECTION:   - Tubular adenoma with high grade dysplasia, no evidence of residual   invasive adenocarcinoma   - Nine reactive lymph nodes, negative for malignancy (0/9)   - Resection margins negative for malignancy and dysplasia     B. COLON, SIGMOID, ANASTOMOTIC RINGS, EXCISION:   - Large bowel wall, negative for malignancy     C. RECTUM, ANASTOMOTIC RINGS, EXCISION:   - Large bowel wall, negative for malignancy       ASSESSMENT AND PLAN:     1.  She has stage IIIB or IIIC moderately differentiated adenocarcinoma of the rectum.   MSI is intact.  It is a cT3 N2 disease.    She completed concurrent radiation with oral Xeloda on 04/26/2017.  She had repeat scanning done on 05/15/2017 which showed a positive response to treatment and no evidence of metastatic disease, although on a CT of the abdomen there was an indeterminate liver lesion for which she underwent an MRI of the abdomen which only showed a few cysts but no suspicious liver lesions.  She then had low anterior resection done on 06/29/17.  Her final pathology showed complete pathological response. There was an area of tubular adenoma with high-grade dysplasia but no invasive cancer was found. 9 lymph nodes were removed and all were negative. All margins were negative. Her final pathology was pTis N0.    Her postoperative course has been complicated by an anastomotic leak requiring drain placement. She still has one drain in place and it is still draining significant amount daily. As per her once it stopped draining then she will have a repeat CT scan. Over the last couple of days the drainage has been significantly less as compared to previously. We discussed that I would like her to be started on adjuvant chemotherapy but in order to start her on chemotherapy I need to make sure that she does not have an active infection. And I would like her surgeon to be in on this as well before we proceed with chemotherapy. We discussed the chemotherapy in detail today. I would choose Xeloda and oxaliplatin every 3 weeks. We discussed the rationale for schedule and potential side effects of it. I would give it to her for 4 months. She was also given a handout on the chemotherapy. I told her that I would like to start soon and not to delay but in order to do that she really needs to be free from active infection. She understands that and she is going to discuss with the surgeon to get their input as well. She did give me consent to proceed with chemotherapy when the time comes    We also discussed  that in case Xeloda is not approved by insurance company and then she will need a port placement and we will proceed with FOLFOX. I also discussed the rationale for schedule and potential side effects of FOLFOX with her.    Her pain is better and she will continue on Dilaudid as needed    She recently had a right ureteral stent removed and the plan is to do a renal scan in the future    I encouraged her to keep herself active and go out for walks as this will help her recuperate faster    We also discussed importance of healthy nutrition    She suffered vocal cord injury during general anesthesia but she has not followed with a speech pathologist and at this time she is not interested in doing so. Her voice seems a little better today. We'll keep a watch on that.    Previously she had met with genetic counselor and had Colonext genetic testing done. 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 None of the other genes had any actionable mutations. This was not addressed today    All of her questions were answered to her satisfaction and she is agreeable and comfortable with this plan    Wisam Lion

## 2017-08-16 NOTE — PATIENT INSTRUCTIONS
We will start chemotherapy ( Xelox ) once your surgeon thinks it is OK to do so from infection risk point of view    See a provider on the day you start chemo with labs prior

## 2017-08-17 ENCOUNTER — CARE COORDINATION (OUTPATIENT)
Dept: ONCOLOGY | Facility: CLINIC | Age: 52
End: 2017-08-17

## 2017-08-17 NOTE — NURSING NOTE
Patient was provided on information for Oxaliplatin chemotherapy - potential side effects reviewed especially the cold sensitivity she may have and she was given instructions on how to help with that.  Also explained that she should monitor for any fever and she should call our office if it goes up to 100.4.  She was given a written handout.  She has had Xeloda in the past and will also be started on this medication once her abscess stops draining.  Her energy is improving.

## 2017-08-17 NOTE — NURSING NOTE
"Oncology Rooming Note    August 17, 2017 9:29 AM   Matthew Stover is a 51 year old female who presents for:    Chief Complaint   Patient presents with     Oncology Clinic Visit     f/u appt     Initial Vitals: /84  Pulse 104  Temp 97.5  F (36.4  C) (Oral)  Resp 20  Ht 1.651 m (5' 5\")  Wt 91.6 kg (202 lb)  SpO2 98%  BMI 33.61 kg/m2 Estimated body mass index is 33.61 kg/(m^2) as calculated from the following:    Height as of this encounter: 1.651 m (5' 5\").    Weight as of this encounter: 91.6 kg (202 lb). Body surface area is 2.05 meters squared.  Mild Pain (3) Comment: dilaudid   No LMP recorded. Patient has had an ablation.  Allergies reviewed: Yes  Medications reviewed: No    Medications: Medication refills not needed today.  Pharmacy name entered into [employer]: [id number] [place], [place] - [address]      10  minutes for nursing intake (face to face time)     Ruba Jordan RN            "

## 2017-08-21 ENCOUNTER — CARE COORDINATION (OUTPATIENT)
Dept: ONCOLOGY | Facility: CLINIC | Age: 52
End: 2017-08-21

## 2017-08-21 NOTE — PROGRESS NOTES
Images from [facility] were pushed to the PACS system and reports sent to be archived to the [facility].

## 2017-09-15 ENCOUNTER — TELEPHONE (OUTPATIENT)
Dept: ONCOLOGY | Facility: CLINIC | Age: 52
End: 2017-09-15

## 2017-10-02 ENCOUNTER — TELEPHONE (OUTPATIENT)
Dept: ONCOLOGY | Facility: CLINIC | Age: 52
End: 2017-10-02

## 2017-10-02 NOTE — TELEPHONE ENCOUNTER
I called Matthew today in order to discuss her genetic testing results. Her blood was drawn on 6/13/2017. ColoNext panel testing was ordered  from [facility]. This testing was done because of Matthew's personal history of early onset rectal cancer.  She has not scheduled a return genetic counseling appointment to discuss these results and screening recommendations.       As Matthew was not available, I left her a voicemail with my contact information and encouraged her to contact me directly to discuss her results; [phone].  A letter summarizing her genetic test results and screening recommendations, based on family history, will be sent to Matthew if I do not hear from her in the next week.       Latosha Purdy MS, [facility]  Certified Genetic Counselor  [phone]

## 2017-10-10 ENCOUNTER — ONCOLOGY VISIT (OUTPATIENT)
Dept: ONCOLOGY | Facility: CLINIC | Age: 52
End: 2017-10-10
Payer: COMMERCIAL

## 2017-10-10 DIAGNOSIS — C20 MALIGNANT NEOPLASM OF RECTUM (H): Primary | ICD-10-CM

## 2017-10-10 PROCEDURE — 99207 ZZC NO CHARGE LOS: CPT | Performed by: GENETIC COUNSELOR, MS

## 2017-10-10 NOTE — MR AVS SNAPSHOT
After Visit Summary   10/10/2017    Matthew Stover    MRN: [id number]           Patient Information     Date Of Birth          1965        Visit Information        Provider Department      10/10/2017 3:30 PM Latosha Purdy [facility]        Today's Diagnoses     Malignant neoplasm of rectum (H)    -  1      Care Instructions      Genetic Testing  You had a blood test that looked at the genetic information in one or more genes associated with increased cancer risk.  The testing looked for any harmful changes that would stop this particular gene from working like it should.  It is important to remember that everyone has variants in multiple genes in their bodies that do not affect how the gene works.  These changes are benign and do not cause disease or increase cancer risk.  The term often used to describe these variants is benign polymorphism.  If an individual is found to have a benign polymorphism, their genetic test result is reported as Negative.    Results  There are three possible results of any genetic test:    Positive--a harmful mutation was identified    Negative--no mutation was identified    Variant of unknown significance--a variation in one of the genes was identified, but it is unclear how this impacts cancer risk in the family    Variant of Unknown Significance (VUS)  A VUS was identified in your blood sample.  Scientists currently do not know if this variant is harmful or if it is a benign polymorphism not associated with any increased risk of cancer.   There are several reasons for this lack of knowledge, but likely your VUS has either never been seen before or has only been seen in a small number of individuals.  Until your VUS is studied in more detail and/or seen in more families, scientists are not able to predict if it is a harmful mutation or a benign polymorphism.  Therefore, the cause of the cancer in you and your relatives is still unknown.           Reclassification  Genetic testing laboratories and researchers are constantly working to determine the importance of variants of unknown significance.  Most laboratories will notify the genetic counselor when a patient s VUS has been reclassified as a harmful mutation or a benign polymorphism.      Some families may be candidates for participation in the laboratory s variant research programs to help determine the importance of their VUS.  Only the testing laboratory can decide who is eligible for participation.  Participating in these programs does not guarantee that families will learn the significance of their VUS immediately.  It could be months or years before a VUS is reclassified.       Screening Recommendations  Cancer screening recommendations for patients with a VUS should be based on their personal and family history rather than the VUS itself.  This may include increased cancer screening for certain individuals in the family.  Your genetic counselor and health care provider can help make appropriate recommendations for you and your relatives.      It is usually not recommended that other relatives have genetic testing for the VUS unless it is done as part of the laboratory s variant research program because an individual s test results should not influence their cancer screening until we determine the importance of the VUS.  Your genetic counselor can help you and your relatives understand the risks and benefits of all genetic testing and cancer screening options.    Please call us if you have any questions or concerns.     Cancer Risk Management Program [phone]-CANCER ([phone])  ? Anamaria Brown, MS, [facility]  [phone]  ? Debo Marks, MS, [facility]  [phone]  ? Mariah Leos, MS, [facility]  [phone]  ? Latosha Purdy, MS, [facility]  [phone]   ? Raghav Arora, MS, [facility]  [phone]            Follow-ups after your visit        Who to contact     If you have questions or need follow up information about  today's clinic visit or your schedule please contact [facility] directly at [phone].  Normal or non-critical lab and imaging results will be communicated to you by [employer], letter or phone within 4 business days after the clinic has received the results. If you do not hear from us within 7 days, please contact the clinic through [employer] or phone. If you have a critical or abnormal lab result, we will notify you by phone as soon as possible.  Submit refill requests through [facility] or call your pharmacy and they will forward the refill request to us. Please allow 3 business days for your refill to be completed.          Additional Information About Your Visit        [employer] Information     [facility] gives you secure access to your electronic health record. If you see a primary care provider, you can also send messages to your care team and make appointments. If you have questions, please call your primary care clinic.  If you do not have a primary care provider, please call [phone] and they will assist you.      [facility] is an electronic gateway that provides easy, online access to your medical records. With [facility], you can request a clinic appointment, read your test results, renew a prescription or communicate with your care team.     To access your existing account, please contact your [facility] or call [phone] for assistance.        Care EveryWhere ID     This is your Care EveryWhere ID. This could be used by other organizations to access your [place] medical records  [id number]         Blood Pressure from Last 3 Encounters:   08/16/17 119/84   07/28/17 136/68   06/13/17 123/75    Weight from Last 3 Encounters:   08/16/17 91.6 kg (202 lb)   07/28/17 92.5 kg (204 lb)   06/13/17 104.8 kg (231 lb)              Today, you had the following     No orders found for display       Primary Care Provider Office Phone # Fax #    Mal Ladd [phone]  [phone]       [address] [address]        Equal Access to Services     CLIFFKENRICK AURY : Hadii andres car hadamangalina Soerasmo, waaxda luqadaha, qaybta kaalmada adechristianada, jonelle gibbs raghavendraasmita agustinfabiolalindy dodge. So [facility] [phone].    ATENCIÓN: Si habla español, tiene a sims disposición servicios gratuitos de asistencia lingüística. Antoineame al [phone].    We comply with applicable federal civil rights laws and [place] laws. We do not discriminate on the basis of race, color, national origin, age, disability, sex, sexual orientation, or gender identity.            Thank you!     Thank you for choosing [facility]  for your care. Our goal is always to provide you with excellent care. Hearing back from our patients is one way we can continue to improve our services. Please take a few minutes to complete the written survey that you may receive in the mail after your visit with us. Thank you!             Your Updated Medication List - Protect others around you: Learn how to safely use, store and throw away your medicines at www.disposemymeds.org.          This list is accurate as of: 10/10/17  3:32 PM.  Always use your most recent med list.                   Brand Name Dispense Instructions for use Diagnosis    capecitabine 500 MG tablet CHEMO    XELODA    196 tablet    Take 4 tablets (2000mg) in the AM and 3 tablets (1500mg) in the PM. Take for 5 days per week Monday-Friday. Do NOT take on Saturday-Sunday. Take with water within 30 minutes after meal.    Malignant neoplasm of rectum (H)       DILAUDID PO           LORazepam 0.5 MG tablet    ATIVAN    30 tablet    Take 1 tablet (0.5 mg) by mouth every 4 hours as needed (Anxiety, Nausea/Vomiting or Sleep)    Malignant neoplasm of rectum (H)       prochlorperazine 10 MG tablet    COMPAZINE    30 tablet    Take 1 tablet (10 mg) by mouth every 6 hours as needed (Nausea/Vomiting)    Malignant neoplasm of rectum (H)

## 2017-10-10 NOTE — LETTER
Cancer Risk Management  Program [facility] Cancer [facility] Cancer [facility] Cancer [facility] Cancer [facility]  Mailing Address  Cancer Risk Management Program  [address]  [place] [address]    New patient appointments  [phone]  October 11, 2017    Matthew Stover  [address] [address] [address]      Dear Matthew,    It was a pleasure meeting with you again at the [facility].  Here is a copy of the progress note from your recent genetic counseling visit to the Cancer Risk Management Program on 10/10/2017.  If you have any additional questions, please feel free to call.    Referring Provider: Wisam Lion MD; NOELLE Franco CNP    Presenting Information:  Matthew returned to the Cancer Risk Management Program at [facility] to discuss her genetic testing results. Her blood was drawn on 6/13/2017.  ColoNext testing  was ordered from [facility]. This testing was done because of her personal history of rectal cancer.    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  No other mutations were found in the MLH1 gene.      Of note, Matthew tested negative for mutations in the following genes by sequencing and deletion/duplication analysis: APC, BMPR1A, CDH1, CHEK2, GREM1, EPCAM, MLH1, MSH2, MSH6, MUTYH, PMS2, POLD1, POLE, PTEN, SMAD4, STK11, and TP53.  We reviewed the autosomal dominant inheritance of these genes.  Matthew cannot pass on a mutation in any of these genes to her children based on this test result.  Mutations in these genes do not skip generations.      Interpretation:  We discussed several different interpretations of this inconclusive test result.  It is not clear if this  variant in the MLH1 gene is associated with increased cancer risk.  1. This variant may be a benign change that does not increase cancer risk.  2. This variant may be a harmful mutation that causes Driscoll Syndrome.  A pathogenic (harmful) mutation in the MLH1 increases the risk for colon, endometrial, ovarian, and stomach cancers.  Other cancers that occur less commonly in Driscoll Syndrome include urinary tract, skin, and brain cancers.  If individuals are found to have a mutation in the MLH1 gene, we would recommend increased cancer screening at younger ages.  Risk reducing surgeries are also available options that can prevent the development of certain cancers.     The lab is working to determine if this variant is harmful or benign, and they will contact me if it is reclassified.  If this variant is determined to be a benign change, there may be a different gene or combination of genes and environment that are associated with Matthew's cancer diagnosis.      Inheritance:  We reviewed the autosomal dominant inheritance of this variant in MLH1 gene.  We discussed that Matthew has a 50% chance to pass this variant to each of her children.   Likewise, her sister also has a 50% risk of having the same variant.  Because it is unclear what, if any, risk is associated with this variant, clinical genetic testing is not recommended for relatives.    Family Studies:  The laboratory may offer additional research based testing for specific relatives in order to help reclassify this variant.    Screening:  Based on this inconclusive test result, it is important for Matthew and her relatives to refer back to the family history for appropriate cancer screening.    Per National Comprehensive Cancer Network (NCCN) guidelines, individuals with a first degree relative with colorectal cancer diagnosed at any age should begin colonoscopy at age 40, or 10 years before the earliest diagnosis of colorectal cancer, whichever is first.  In Matthew's  family, colonoscopy should start at age 39.  Colonoscopy should be repeated every 5-10 years, or per colonoscopy findings.  This would apply to her sister and children.  Other population cancer screening, such as recommendations by the American Cancer Society, are also appropriate for Matthew and her family.  These screening recommendations may change if there are changes to Matthew's personal and/or family history.  Final screening recommendations should be made by each individual's managing physician.    Summary:  We do not have an explanation for her rectal cancer.  Because of that, it is important that she and her close relatives continue with cancer screening based on her personal and family history as discussed above.    Genetic testing is rapidly advancing, and new cancer susceptibility genes will most likely be identified in the future.  Therefore, I encouraged Matthew to contact me if there are changes in her personal or family history.  This may change how we assess her cancer risk, screening, and the testing we would offer.    Plan:  1.  I provided Matthew with a copy of her test results today.    2. She plans to follow-up with her care providers for treatment.  3. She should contact me annually, or sooner if family history changes.  4. I will contact Matthew if the lab informs me that this VUS has been reclassified.  This may change screening and testing recommendations for relatives.    If Matthew has any further questions, I encouraged her to contact me at [phone].    Latosha Purdy MS, [facility]  Certified Genetic Counselor  [phone]

## 2017-10-10 NOTE — PROGRESS NOTES
"10/10/2017    Referring Provider: Wisam Lion MD; NOELLE Franco CNP    Presenting Information:  Matthew returned to the Cancer Risk Management Program at [facility] to discuss her genetic testing results. Her blood was drawn on 6/13/2017.  ColoNext testing  was ordered from [facility]. This testing was done because of her personal history of rectal cancer.    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  No other mutations were found in the MLH1 gene.      Of note, Matthew tested negative for mutations in the following genes by sequencing and deletion/duplication analysis: APC, BMPR1A, CDH1, CHEK2, GREM1, EPCAM, MLH1, MSH2, MSH6, MUTYH, PMS2, POLD1, POLE, PTEN, SMAD4, STK11, and TP53.  We reviewed the autosomal dominant inheritance of these genes.  Matthew cannot pass on a mutation in any of these genes to her children based on this test result.  Mutations in these genes do not skip generations.      A copy of the test report can be found in the Laboratory tab, dated 6/13/2017, and named \"[facility]\". The report is scanned in as a linked document.    Interpretation:  We discussed several different interpretations of this inconclusive test result.  It is not clear if this variant in the MLH1 gene is associated with increased cancer risk.  1. This variant may be a benign change that does not increase cancer risk.  2. This variant may be a harmful mutation that causes Driscoll Syndrome.  A pathogenic (harmful) mutation in the MLH1 increases the risk for colon, endometrial, ovarian, and stomach cancers.  Other cancers that occur less commonly in Driscoll Syndrome include urinary tract, skin, and brain cancers.  If individuals are found to have a mutation in the MLH1 gene, we would recommend increased cancer screening at younger ages.  Risk reducing surgeries are also " available options that can prevent the development of certain cancers.     The lab is working to determine if this variant is harmful or benign, and they will contact me if it is reclassified.  If this variant is determined to be a benign change, there may be a different gene or combination of genes and environment that are associated with Matthew's cancer diagnosis.      Inheritance:  We reviewed the autosomal dominant inheritance of this variant in MLH1 gene.  We discussed that Matthew has a 50% chance to pass this variant to each of her children.   Likewise, her sister also has a 50% risk of having the same variant.  Because it is unclear what, if any, risk is associated with this variant, clinical genetic testing is not recommended for relatives.    Family Studies:  The laboratory may offer additional research based testing for specific relatives in order to help reclassify this variant.    Screening:  Based on this inconclusive test result, it is important for Matthew and her relatives to refer back to the family history for appropriate cancer screening.    Per National Comprehensive Cancer Network (NCCN) guidelines, individuals with a first degree relative with colorectal cancer diagnosed at any age should begin colonoscopy at age 40, or 10 years before the earliest diagnosis of colorectal cancer, whichever is first.  In Matthew's family, colonoscopy should start at age 39.  Colonoscopy should be repeated every 5-10 years, or per colonoscopy findings.  This would apply to her sister and children.  Other population cancer screening, such as recommendations by the American Cancer Society, are also appropriate for Matthew and her family.  These screening recommendations may change if there are changes to Matthew's personal and/or family history.  Final screening recommendations should be made by each individual's managing physician.    Summary:  We do not have an explanation for her rectal cancer.  Because of that, it is important  that she and her close relatives continue with cancer screening based on her personal and family history as discussed above.    Genetic testing is rapidly advancing, and new cancer susceptibility genes will most likely be identified in the future.  Therefore, I encouraged Matthew to contact me if there are changes in her personal or family history.  This may change how we assess her cancer risk, screening, and the testing we would offer.    Plan:  1.  I provided Matthew with a copy of her test results today.    2. She plans to follow-up with her care providers for treatment.  3. She should contact me annually, or sooner if family history changes.  4. I will contact Matthew if the lab informs me that this VUS has been reclassified.  This may change screening and testing recommendations for relatives.    If Matthew has any further questions, I encouraged her to contact me at [phone].    Time spent face to face: 10 minutes.    Latosha Purdy MS, [employer] [facility]  Certified Genetic Counselor  [phone]

## 2017-10-10 NOTE — PATIENT INSTRUCTIONS
Genetic Testing  You had a blood test that looked at the genetic information in one or more genes associated with increased cancer risk.  The testing looked for any harmful changes that would stop this particular gene from working like it should.  It is important to remember that everyone has variants in multiple genes in their bodies that do not affect how the gene works.  These changes are benign and do not cause disease or increase cancer risk.  The term often used to describe these variants is benign polymorphism.  If an individual is found to have a benign polymorphism, their genetic test result is reported as Negative.    Results  There are three possible results of any genetic test:    Positive--a harmful mutation was identified    Negative--no mutation was identified    Variant of unknown significance--a variation in one of the genes was identified, but it is unclear how this impacts cancer risk in the family    Variant of Unknown Significance (VUS)  A VUS was identified in your blood sample.  Scientists currently do not know if this variant is harmful or if it is a benign polymorphism not associated with any increased risk of cancer.   There are several reasons for this lack of knowledge, but likely your VUS has either never been seen before or has only been seen in a small number of individuals.  Until your VUS is studied in more detail and/or seen in more families, scientists are not able to predict if it is a harmful mutation or a benign polymorphism.  Therefore, the cause of the cancer in you and your relatives is still unknown.          Reclassification  Genetic testing laboratories and researchers are constantly working to determine the importance of variants of unknown significance.  Most laboratories will notify the genetic counselor when a patient s VUS has been reclassified as a harmful mutation or a benign polymorphism.      Some families may be candidates for participation in the laboratory s  variant research programs to help determine the importance of their VUS.  Only the testing laboratory can decide who is eligible for participation.  Participating in these programs does not guarantee that families will learn the significance of their VUS immediately.  It could be months or years before a VUS is reclassified.       Screening Recommendations  Cancer screening recommendations for patients with a VUS should be based on their personal and family history rather than the VUS itself.  This may include increased cancer screening for certain individuals in the family.  Your genetic counselor and health care provider can help make appropriate recommendations for you and your relatives.      It is usually not recommended that other relatives have genetic testing for the VUS unless it is done as part of the laboratory s variant research program because an individual s test results should not influence their cancer screening until we determine the importance of the VUS.  Your genetic counselor can help you and your relatives understand the risks and benefits of all genetic testing and cancer screening options.    Please call us if you have any questions or concerns.     Cancer Risk Management Program [id number] ([phone])  ? Anamaria Brown, MS, [facility] [place]  [phone]  ? Debo Marks, MS, [facility]  [phone]  ? Mariah Leos, MS, [facility] [place]  [phone]  ? Latosha Purdy, MS, [facility] [place]  [phone]   ? Raghav Arora, MS, [facility]  [phone]

## 2017-11-03 NOTE — TELEPHONE ENCOUNTER
I called patient today & left a VM to schedule an appt with Latosha. Asked pt to call us to schedule. See Latosha's message below,     Please re contact this patient to schedule a return genetic counseling appointment to review her results.  She has previously canceled, but has not rescheduled.  Please let me know if you are unable to reach her, and I will follow up.       Thanks,     Latosha Purdy MS, [facility]   Certified Genetic Counselor   [phone]   no loss of consciousness/no hemiparesis/no focal seizures/no loss of sensation/no headache/no confusion/no facial palsy/no weakness/no generalized seizures/no paresthesias/no syncope/no tremors/no transient paralysis

## 2019-05-31 NOTE — PROGRESS NOTES
ORAL CHEMOTHERAPY DISCONTINUATION       Primary Oncologist:  Dr. Wisma Lion  Primary Oncology Clinic: [facility]  Cancer Diagnosis:  Rectal Cancer  Therapy History:  Therapy History:  Start Date: 3/20/17  Expected duration of therapy: For 28 days during XRT M-F. Total of 56 doses.  Therapy Ended On:  4/26/2017  Reason For Discontinuation: therapy completed    Additional Notes:  Thank you for the opportunity to be a part in the care of this patient's oral chemotherapy. The oncology pharmacy will no longer be following this patient for oral chemotherapy. If there are any questions or the plan changes, feel free to contact us.    Mari Stokes, Pharm. D.  [phone]         Patient back to unit from MRI.

## 2019-10-03 ENCOUNTER — HEALTH MAINTENANCE LETTER (OUTPATIENT)
Age: 54
End: 2019-10-03

## 2020-11-07 ENCOUNTER — HEALTH MAINTENANCE LETTER (OUTPATIENT)
Age: 55
End: 2020-11-07

## 2021-03-27 ENCOUNTER — HEALTH MAINTENANCE LETTER (OUTPATIENT)
Age: 56
End: 2021-03-27

## 2021-09-05 ENCOUNTER — HEALTH MAINTENANCE LETTER (OUTPATIENT)
Age: 56
End: 2021-09-05

## 2021-12-26 ENCOUNTER — HEALTH MAINTENANCE LETTER (OUTPATIENT)
Age: 56
End: 2021-12-26

## 2022-04-17 ENCOUNTER — HEALTH MAINTENANCE LETTER (OUTPATIENT)
Age: 57
End: 2022-04-17

## 2022-10-23 ENCOUNTER — HEALTH MAINTENANCE LETTER (OUTPATIENT)
Age: 57
End: 2022-10-23

## 2023-04-02 ENCOUNTER — HEALTH MAINTENANCE LETTER (OUTPATIENT)
Age: 58
End: 2023-04-02